# Patient Record
Sex: FEMALE | Race: WHITE | NOT HISPANIC OR LATINO
[De-identification: names, ages, dates, MRNs, and addresses within clinical notes are randomized per-mention and may not be internally consistent; named-entity substitution may affect disease eponyms.]

---

## 2017-07-18 PROBLEM — Z00.00 ENCOUNTER FOR PREVENTIVE HEALTH EXAMINATION: Status: ACTIVE | Noted: 2017-07-18

## 2017-08-01 ENCOUNTER — APPOINTMENT (OUTPATIENT)
Dept: CARDIOLOGY | Facility: CLINIC | Age: 27
End: 2017-08-01

## 2017-08-11 ENCOUNTER — APPOINTMENT (OUTPATIENT)
Dept: CARDIOLOGY | Facility: CLINIC | Age: 27
End: 2017-08-11
Payer: MEDICAID

## 2017-08-11 VITALS
SYSTOLIC BLOOD PRESSURE: 112 MMHG | HEART RATE: 85 BPM | WEIGHT: 100 LBS | DIASTOLIC BLOOD PRESSURE: 78 MMHG | HEIGHT: 63 IN | BODY MASS INDEX: 17.72 KG/M2

## 2017-08-11 DIAGNOSIS — R00.2 PALPITATIONS: ICD-10-CM

## 2017-08-11 DIAGNOSIS — Z82.3 FAMILY HISTORY OF STROKE: ICD-10-CM

## 2017-08-11 DIAGNOSIS — Z82.49 FAMILY HISTORY OF ISCHEMIC HEART DISEASE AND OTHER DISEASES OF THE CIRCULATORY SYSTEM: ICD-10-CM

## 2017-08-11 DIAGNOSIS — D64.9 ANEMIA, UNSPECIFIED: ICD-10-CM

## 2017-08-11 DIAGNOSIS — R06.02 SHORTNESS OF BREATH: ICD-10-CM

## 2017-08-11 DIAGNOSIS — Z87.09 PERSONAL HISTORY OF OTHER DISEASES OF THE RESPIRATORY SYSTEM: ICD-10-CM

## 2017-08-11 PROCEDURE — 99204 OFFICE O/P NEW MOD 45 MIN: CPT

## 2017-08-11 PROCEDURE — 93306 TTE W/DOPPLER COMPLETE: CPT

## 2017-08-11 RX ORDER — MULTIVIT-MIN/IRON/FOLIC ACID/K 18-600-40
500 CAPSULE ORAL DAILY
Refills: 0 | Status: ACTIVE | COMMUNITY

## 2017-08-11 RX ORDER — FERROUS SULFATE 325(65) MG
325 (65 FE) TABLET ORAL DAILY
Refills: 0 | Status: ACTIVE | COMMUNITY

## 2017-10-10 ENCOUNTER — APPOINTMENT (OUTPATIENT)
Dept: CARDIOLOGY | Facility: CLINIC | Age: 27
End: 2017-10-10

## 2019-08-23 ENCOUNTER — RESULT REVIEW (OUTPATIENT)
Age: 29
End: 2019-08-23

## 2020-03-21 ENCOUNTER — EMERGENCY (EMERGENCY)
Facility: HOSPITAL | Age: 30
LOS: 1 days | Discharge: HOME | End: 2020-03-21
Attending: EMERGENCY MEDICINE
Payer: MEDICAID

## 2020-03-21 VITALS
TEMPERATURE: 98 F | SYSTOLIC BLOOD PRESSURE: 117 MMHG | HEART RATE: 95 BPM | WEIGHT: 108.03 LBS | HEIGHT: 63 IN | OXYGEN SATURATION: 100 % | DIASTOLIC BLOOD PRESSURE: 79 MMHG | RESPIRATION RATE: 18 BRPM

## 2020-03-21 VITALS
TEMPERATURE: 99 F | DIASTOLIC BLOOD PRESSURE: 69 MMHG | OXYGEN SATURATION: 99 % | RESPIRATION RATE: 18 BRPM | HEART RATE: 85 BPM | SYSTOLIC BLOOD PRESSURE: 109 MMHG

## 2020-03-21 LAB — RAPID RVP RESULT: SIGNIFICANT CHANGE UP

## 2020-03-21 PROCEDURE — 87633 RESP VIRUS 12-25 TARGETS: CPT

## 2020-03-21 PROCEDURE — 71045 X-RAY EXAM CHEST 1 VIEW: CPT

## 2020-03-21 PROCEDURE — 94640 AIRWAY INHALATION TREATMENT: CPT

## 2020-03-21 PROCEDURE — 99284 EMERGENCY DEPT VISIT MOD MDM: CPT | Mod: 25

## 2020-03-21 PROCEDURE — 87581 M.PNEUMON DNA AMP PROBE: CPT

## 2020-03-21 PROCEDURE — 71045 X-RAY EXAM CHEST 1 VIEW: CPT | Mod: 26

## 2020-03-21 PROCEDURE — 99285 EMERGENCY DEPT VISIT HI MDM: CPT

## 2020-03-21 PROCEDURE — 93010 ELECTROCARDIOGRAM REPORT: CPT

## 2020-03-21 PROCEDURE — U0001: CPT

## 2020-03-21 PROCEDURE — 87798 DETECT AGENT NOS DNA AMP: CPT

## 2020-03-21 PROCEDURE — 87486 CHLMYD PNEUM DNA AMP PROBE: CPT

## 2020-03-21 PROCEDURE — 93005 ELECTROCARDIOGRAM TRACING: CPT

## 2020-03-21 RX ORDER — IPRATROPIUM/ALBUTEROL SULFATE 18-103MCG
3 AEROSOL WITH ADAPTER (GRAM) INHALATION ONCE
Refills: 0 | Status: COMPLETED | OUTPATIENT
Start: 2020-03-21 | End: 2020-03-21

## 2020-03-21 RX ORDER — ACETAMINOPHEN 500 MG
650 TABLET ORAL ONCE
Refills: 0 | Status: COMPLETED | OUTPATIENT
Start: 2020-03-21 | End: 2020-03-21

## 2020-03-21 RX ORDER — AZITHROMYCIN 500 MG/1
500 TABLET, FILM COATED ORAL ONCE
Refills: 0 | Status: COMPLETED | OUTPATIENT
Start: 2020-03-21 | End: 2020-03-21

## 2020-03-21 RX ORDER — AZITHROMYCIN 500 MG/1
1 TABLET, FILM COATED ORAL
Qty: 7 | Refills: 0
Start: 2020-03-21 | End: 2020-03-27

## 2020-03-21 RX ADMIN — Medication 650 MILLIGRAM(S): at 12:14

## 2020-03-21 RX ADMIN — AZITHROMYCIN 500 MILLIGRAM(S): 500 TABLET, FILM COATED ORAL at 09:02

## 2020-03-21 RX ADMIN — Medication 3 MILLILITER(S): at 09:03

## 2020-03-21 RX ADMIN — Medication 50 MILLIGRAM(S): at 09:03

## 2020-03-21 NOTE — ED ADULT NURSE NOTE - OBJECTIVE STATEMENT
patient with mild sob and low grade fever, comes in to be tested for COVID-19 because of  hx of asthma. denies any other recent contact. patient placed on isolation and educated about quarantined.

## 2020-03-21 NOTE — ED ADULT NURSE NOTE - NSIMPLEMENTINTERV_GEN_ALL_ED
Implemented All Universal Safety Interventions:  [place] to call system. Call bell, personal items and telephone within reach. Instruct patient to call for assistance. Room bathroom lighting operational. Non-slip footwear when patient is off stretcher. Physically safe environment: no spills, clutter or unnecessary equipment. Stretcher in lowest position, wheels locked, appropriate side rails in place.

## 2020-03-21 NOTE — ED ADULT TRIAGE NOTE - CHIEF COMPLAINT QUOTE
patient states that she has been sick for 2 weeks with fever SOB and tightness in chest, HX of asthma

## 2020-03-21 NOTE — ED PROVIDER NOTE - PATIENT PORTAL LINK FT
You can access the FollowMyHealth Patient Portal offered by [facility] by registering at the following website: [email or web address]. By joining [employer]’s FollowMyHealth portal, you will also be able to view your health information using other applications (apps) compatible with our system.

## 2020-03-21 NOTE — ED PROVIDER NOTE - OBJECTIVE STATEMENT
The patient is a 30 year old female presents with SOB and cough with history of asthma for 2 weeks  Mild fever  No sick contacts, No travel

## 2020-09-16 ENCOUNTER — RESULT REVIEW (OUTPATIENT)
Age: 30
End: 2020-09-16

## 2021-11-14 ENCOUNTER — EMERGENCY (EMERGENCY)
Facility: HOSPITAL | Age: 31
LOS: 1 days | Discharge: HOME | End: 2021-11-14
Attending: EMERGENCY MEDICINE | Admitting: EMERGENCY MEDICINE
Payer: MEDICAID

## 2021-11-14 VITALS
SYSTOLIC BLOOD PRESSURE: 157 MMHG | HEART RATE: 115 BPM | RESPIRATION RATE: 16 BRPM | DIASTOLIC BLOOD PRESSURE: 90 MMHG | OXYGEN SATURATION: 100 % | TEMPERATURE: 98 F

## 2021-11-14 PROCEDURE — 99285 EMERGENCY DEPT VISIT HI MDM: CPT

## 2021-11-14 NOTE — ED ADULT TRIAGE NOTE - CHIEF COMPLAINT QUOTE
Pt w/ hx of asthma, Beta Thalassemia c/o severe posterior headache radiating anteriorly with associated blurry vision and feeling light headed, nauseous, decreased appetite, and muscle soreness x  4 days.  LMP = 11/9, Pt endorses pain with left lateral rotation and flexion of neck  Pt reports taking Advil 500mg at approximately 1600 today.

## 2021-11-15 ENCOUNTER — EMERGENCY (EMERGENCY)
Facility: HOSPITAL | Age: 31
LOS: 1 days | Discharge: HOME | End: 2021-11-15
Attending: EMERGENCY MEDICINE | Admitting: EMERGENCY MEDICINE
Payer: MEDICAID

## 2021-11-15 VITALS
RESPIRATION RATE: 18 BRPM | SYSTOLIC BLOOD PRESSURE: 105 MMHG | HEART RATE: 82 BPM | OXYGEN SATURATION: 100 % | DIASTOLIC BLOOD PRESSURE: 73 MMHG | TEMPERATURE: 98 F

## 2021-11-15 VITALS
HEART RATE: 100 BPM | DIASTOLIC BLOOD PRESSURE: 86 MMHG | OXYGEN SATURATION: 100 % | TEMPERATURE: 98 F | SYSTOLIC BLOOD PRESSURE: 125 MMHG | RESPIRATION RATE: 16 BRPM

## 2021-11-15 LAB
ALBUMIN SERPL ELPH-MCNC: 4.9 G/DL — SIGNIFICANT CHANGE UP (ref 3.3–5)
ALP SERPL-CCNC: 66 U/L — SIGNIFICANT CHANGE UP (ref 40–120)
ALT FLD-CCNC: 12 U/L — SIGNIFICANT CHANGE UP (ref 4–33)
ANION GAP SERPL CALC-SCNC: 13 MMOL/L — SIGNIFICANT CHANGE UP (ref 7–14)
ANISOCYTOSIS BLD QL: SLIGHT — SIGNIFICANT CHANGE UP
AST SERPL-CCNC: 14 U/L — SIGNIFICANT CHANGE UP (ref 4–32)
B PERT DNA SPEC QL NAA+PROBE: SIGNIFICANT CHANGE UP
B PERT+PARAPERT DNA PNL SPEC NAA+PROBE: SIGNIFICANT CHANGE UP
BASOPHILS # BLD AUTO: 0 K/UL — SIGNIFICANT CHANGE UP (ref 0–0.2)
BASOPHILS NFR BLD AUTO: 0 % — SIGNIFICANT CHANGE UP (ref 0–2)
BILIRUB SERPL-MCNC: 0.4 MG/DL — SIGNIFICANT CHANGE UP (ref 0.2–1.2)
BORDETELLA PARAPERTUSSIS (RAPRVP): SIGNIFICANT CHANGE UP
BUN SERPL-MCNC: 7 MG/DL — SIGNIFICANT CHANGE UP (ref 7–23)
C PNEUM DNA SPEC QL NAA+PROBE: SIGNIFICANT CHANGE UP
CALCIUM SERPL-MCNC: 9.7 MG/DL — SIGNIFICANT CHANGE UP (ref 8.4–10.5)
CHLORIDE SERPL-SCNC: 103 MMOL/L — SIGNIFICANT CHANGE UP (ref 98–107)
CO2 SERPL-SCNC: 22 MMOL/L — SIGNIFICANT CHANGE UP (ref 22–31)
CREAT SERPL-MCNC: 0.49 MG/DL — LOW (ref 0.5–1.3)
EOSINOPHIL # BLD AUTO: 0 K/UL — SIGNIFICANT CHANGE UP (ref 0–0.5)
EOSINOPHIL NFR BLD AUTO: 0 % — SIGNIFICANT CHANGE UP (ref 0–6)
FLUAV SUBTYP SPEC NAA+PROBE: SIGNIFICANT CHANGE UP
FLUBV RNA SPEC QL NAA+PROBE: SIGNIFICANT CHANGE UP
GIANT PLATELETS BLD QL SMEAR: PRESENT — SIGNIFICANT CHANGE UP
GLUCOSE SERPL-MCNC: 85 MG/DL — SIGNIFICANT CHANGE UP (ref 70–99)
HADV DNA SPEC QL NAA+PROBE: SIGNIFICANT CHANGE UP
HCG UR QL: NEGATIVE — SIGNIFICANT CHANGE UP
HCOV 229E RNA SPEC QL NAA+PROBE: SIGNIFICANT CHANGE UP
HCOV HKU1 RNA SPEC QL NAA+PROBE: SIGNIFICANT CHANGE UP
HCOV NL63 RNA SPEC QL NAA+PROBE: SIGNIFICANT CHANGE UP
HCOV OC43 RNA SPEC QL NAA+PROBE: SIGNIFICANT CHANGE UP
HCT VFR BLD CALC: 33 % — LOW (ref 34.5–45)
HGB BLD-MCNC: 10.7 G/DL — LOW (ref 11.5–15.5)
HMPV RNA SPEC QL NAA+PROBE: SIGNIFICANT CHANGE UP
HPIV1 RNA SPEC QL NAA+PROBE: SIGNIFICANT CHANGE UP
HPIV2 RNA SPEC QL NAA+PROBE: SIGNIFICANT CHANGE UP
HPIV3 RNA SPEC QL NAA+PROBE: SIGNIFICANT CHANGE UP
HPIV4 RNA SPEC QL NAA+PROBE: SIGNIFICANT CHANGE UP
IANC: 5.73 K/UL — SIGNIFICANT CHANGE UP (ref 1.5–8.5)
LYMPHOCYTES # BLD AUTO: 1.67 K/UL — SIGNIFICANT CHANGE UP (ref 1–3.3)
LYMPHOCYTES # BLD AUTO: 19.1 % — SIGNIFICANT CHANGE UP (ref 13–44)
M PNEUMO DNA SPEC QL NAA+PROBE: SIGNIFICANT CHANGE UP
MANUAL SMEAR VERIFICATION: SIGNIFICANT CHANGE UP
MCHC RBC-ENTMCNC: 20.9 PG — LOW (ref 27–34)
MCHC RBC-ENTMCNC: 32.4 GM/DL — SIGNIFICANT CHANGE UP (ref 32–36)
MCV RBC AUTO: 64.3 FL — LOW (ref 80–100)
MICROCYTES BLD QL: SIGNIFICANT CHANGE UP
MONOCYTES # BLD AUTO: 0.31 K/UL — SIGNIFICANT CHANGE UP (ref 0–0.9)
MONOCYTES NFR BLD AUTO: 3.5 % — SIGNIFICANT CHANGE UP (ref 2–14)
NEUTROPHILS # BLD AUTO: 6.09 K/UL — SIGNIFICANT CHANGE UP (ref 1.8–7.4)
NEUTROPHILS NFR BLD AUTO: 69.6 % — SIGNIFICANT CHANGE UP (ref 43–77)
OVALOCYTES BLD QL SMEAR: SLIGHT — SIGNIFICANT CHANGE UP
PLAT MORPH BLD: NORMAL — SIGNIFICANT CHANGE UP
PLATELET # BLD AUTO: 297 K/UL — SIGNIFICANT CHANGE UP (ref 150–400)
PLATELET COUNT - ESTIMATE: NORMAL — SIGNIFICANT CHANGE UP
POIKILOCYTOSIS BLD QL AUTO: SIGNIFICANT CHANGE UP
POTASSIUM SERPL-MCNC: 3.7 MMOL/L — SIGNIFICANT CHANGE UP (ref 3.5–5.3)
POTASSIUM SERPL-SCNC: 3.7 MMOL/L — SIGNIFICANT CHANGE UP (ref 3.5–5.3)
PROT SERPL-MCNC: 8 G/DL — SIGNIFICANT CHANGE UP (ref 6–8.3)
RAPID RVP RESULT: SIGNIFICANT CHANGE UP
RBC # BLD: 5.13 M/UL — SIGNIFICANT CHANGE UP (ref 3.8–5.2)
RBC # FLD: 15.5 % — HIGH (ref 10.3–14.5)
RBC BLD AUTO: ABNORMAL
RSV RNA SPEC QL NAA+PROBE: SIGNIFICANT CHANGE UP
RV+EV RNA SPEC QL NAA+PROBE: SIGNIFICANT CHANGE UP
SARS-COV-2 RNA SPEC QL NAA+PROBE: SIGNIFICANT CHANGE UP
SODIUM SERPL-SCNC: 138 MMOL/L — SIGNIFICANT CHANGE UP (ref 135–145)
VARIANT LYMPHS # BLD: 7.8 % — HIGH (ref 0–6)
WBC # BLD: 8.75 K/UL — SIGNIFICANT CHANGE UP (ref 3.8–10.5)
WBC # FLD AUTO: 8.75 K/UL — SIGNIFICANT CHANGE UP (ref 3.8–10.5)

## 2021-11-15 PROCEDURE — 99284 EMERGENCY DEPT VISIT MOD MDM: CPT

## 2021-11-15 PROCEDURE — G1004: CPT

## 2021-11-15 PROCEDURE — 70450 CT HEAD/BRAIN W/O DYE: CPT | Mod: 26,MF

## 2021-11-15 RX ORDER — METOCLOPRAMIDE HCL 10 MG
10 TABLET ORAL ONCE
Refills: 0 | Status: DISCONTINUED | OUTPATIENT
Start: 2021-11-15 | End: 2021-11-15

## 2021-11-15 RX ORDER — ACETAMINOPHEN 500 MG
650 TABLET ORAL ONCE
Refills: 0 | Status: COMPLETED | OUTPATIENT
Start: 2021-11-15 | End: 2021-11-15

## 2021-11-15 RX ORDER — METOCLOPRAMIDE HCL 10 MG
10 TABLET ORAL ONCE
Refills: 0 | Status: COMPLETED | OUTPATIENT
Start: 2021-11-15 | End: 2021-11-15

## 2021-11-15 RX ORDER — METOCLOPRAMIDE HCL 10 MG
5 TABLET ORAL ONCE
Refills: 0 | Status: DISCONTINUED | OUTPATIENT
Start: 2021-11-15 | End: 2021-11-15

## 2021-11-15 RX ORDER — KETOROLAC TROMETHAMINE 30 MG/ML
15 SYRINGE (ML) INJECTION ONCE
Refills: 0 | Status: DISCONTINUED | OUTPATIENT
Start: 2021-11-15 | End: 2021-11-15

## 2021-11-15 RX ORDER — SODIUM CHLORIDE 9 MG/ML
1000 INJECTION INTRAMUSCULAR; INTRAVENOUS; SUBCUTANEOUS ONCE
Refills: 0 | Status: COMPLETED | OUTPATIENT
Start: 2021-11-15 | End: 2021-11-15

## 2021-11-15 RX ORDER — ACETAMINOPHEN 500 MG
975 TABLET ORAL ONCE
Refills: 0 | Status: COMPLETED | OUTPATIENT
Start: 2021-11-15 | End: 2021-11-15

## 2021-11-15 RX ADMIN — SODIUM CHLORIDE 1000 MILLILITER(S): 9 INJECTION INTRAMUSCULAR; INTRAVENOUS; SUBCUTANEOUS at 17:52

## 2021-11-15 RX ADMIN — Medication 15 MILLIGRAM(S): at 17:52

## 2021-11-15 RX ADMIN — Medication 10 MILLIGRAM(S): at 17:53

## 2021-11-15 RX ADMIN — Medication 975 MILLIGRAM(S): at 00:51

## 2021-11-15 RX ADMIN — Medication 10 MILLIGRAM(S): at 00:51

## 2021-11-15 RX ADMIN — Medication 650 MILLIGRAM(S): at 17:52

## 2021-11-15 RX ADMIN — SODIUM CHLORIDE 1000 MILLILITER(S): 9 INJECTION INTRAMUSCULAR; INTRAVENOUS; SUBCUTANEOUS at 00:51

## 2021-11-15 NOTE — ED ADULT TRIAGE NOTE - CHIEF COMPLAINT QUOTE
pt c/o headache and nausea without vomiting. Was seen yesterday for symptoms and advised to return to ED if symptoms persist. No neuro deficits noted.

## 2021-11-15 NOTE — ED PROVIDER NOTE - PATIENT PORTAL LINK FT
You can access the FollowMyHealth Patient Portal offered by [facility] by registering at the following website: [email or web address]. By joining [employer]’s FollowMyHealth portal, you will also be able to view your health information using other applications (apps) compatible with our system.

## 2021-11-15 NOTE — ED ADULT NURSE NOTE - NSIMPLEMENTINTERV_GEN_ALL_ED
Implemented All Universal Safety Interventions:  [place] to call system. Call bell, personal items and telephone within reach. Instruct patient to call for assistance. Room bathroom lighting operational. Non-slip footwear when patient is off stretcher. Physically safe environment: no spills, clutter or unnecessary equipment. Stretcher in lowest position, wheels locked, appropriate side rails in place.

## 2021-11-15 NOTE — ED PROVIDER NOTE - PROGRESS NOTE DETAILS
Patient reported that their headache completely resolved. They report a family emergency that will keep their children at home unattended. Spoke to patient about the risks of discharge at this time including disability or death given her presentation, the lack of CT imaging, and her family history of aneurysms. Patient completely accepted risks and reports that they will follow up with their Neurologist on the 11/30. Patient advised to move follow up sooner. Patient is AxOx4 and of great decision making capacity. Plan to discharge patient. Patient given follow up and return precautions. Patient reported that her headache completely resolved. She reports a family emergency that will keep their children at home unattended. Spoke to patient about the risks of discharge at this time including disability or death given her presentation, the lack of CT imaging, and her family history of aneurysms. Patient completely accepted risks and reports that they will follow up with their Neurologist on the 11/30. Patient advised to move follow up sooner. Patient is AxOx4 and of great decision making capacity. Plan to discharge patient. Instructed that she may return to the ED at any time to continue care. Patient given follow up and return precautions.

## 2021-11-15 NOTE — ED PROVIDER NOTE - NSFOLLOWUPINSTRUCTIONS_ED_ALL_ED_FT
Headache    A headache is pain or discomfort felt around the head or neck area. The specific cause of a headache may not be found as there are many types including tension headaches, migraine headaches, and cluster headaches. Watch your condition for any changes. Things you can do to manage your pain include taking over the counter and prescription medications as instructed by your health care provider, lying down in a dark quiet room, limiting stress, getting regular sleep, and refraining from alcohol and tobacco products.    Follow up as soon as possible with your neurologist. Take tylenol as needed for the headaches.     SEEK IMMEDIATE MEDICAL CARE IF YOU HAVE ANY OF THE FOLLOWING SYMPTOMS: fever, vomiting, stiff neck, loss of vision, problems with speech, muscle weakness, loss of balance, trouble walking, passing out, or confusion.

## 2021-11-15 NOTE — ED PROVIDER NOTE - PHYSICAL EXAMINATION
GEN - NAD; well appearing; A+O x3   HEAD - NC/AT   EYES- PERRL, EOMI  ENT: Airway patent, mmm, Oral cavity and pharynx normal. No inflammation, swelling, exudate, or lesions.    NECK: Neck supple, FROM, no midline spinal ttp, +lymph node to r. posterior cervical region, mobile, minimally tender  PULMONARY - CTA b/l, symmetric breath sounds.   CARDIAC -s1s2, RRR, no M,G,R  ABDOMEN - +BS, ND, NT, soft, no guarding, no rebound, no masses   BACK - no CVA tenderness, Normal  spine   EXTREMITIES - FROM, no edema   SKIN - no rash or bruising   NEUROLOGIC - ao3, cn2-12 intact, 5/5 strength in all extremities, sensation grossly intact, f-n nl, no dysdiadochokinesia, gait steady  PSYCH -nl mood/affect, nl insight.

## 2021-11-15 NOTE — ED PROVIDER NOTE - ATTENDING CONTRIBUTION TO CARE
DR. MANSFIELD, ATTENDING MD-  I performed a face to face bedside interview with the patient regarding history of present illness, review of symptoms and past medical history. I completed an independent physical exam.  I have discussed the patient's plan of care with the PA.   Documentation as above in the note.    32 y/o female with c/o ha x3 days.  States she frequently has ha's however this episode is prolonged in duration vs typical ha's.  She states onset was gradual, feels like a band is squeezing around her head.  No f/c, neck stiffness, vision change, numbness/tingling/weakness to arms/legs, balance issues.  Given change in character from prior ha's will obtain neuroimaging, tx symptomatically.  Obtain ct head give ivf reglan toradol, likely dc with ha center referral sheet.

## 2021-11-15 NOTE — ED PROVIDER NOTE - OBJECTIVE STATEMENT
28 y/o female pmh asthma, anemia c/o headache x 3 day. Pt was seen in the ER x 1 day ago and felt better fater treatment and went home. Pt states that she woke up today with worsening R sided headache, neck pain and R shoulder pain. Pt denies taking any OTC meds. Denies numbness, tingling, weakness, dizziness, photophobia, fever or chills.

## 2021-11-15 NOTE — ED PROVIDER NOTE - NS ED ROS FT
ROS:  GENERAL: No fever, no chills  EYES: no change in vision  HEENT: no trouble swallowing, no trouble speaking  CARDIAC: no chest pain  PULMONARY: no cough, no shortness of breath  GI: no abdominal pain, no nausea, no vomiting, no diarrhea, no constipation  : No dysuria, no frequency, no change in appearance, or odor of urine  SKIN: no rashes  NEURO: + headache, no weakness, no numbness  MSK: +myalgias

## 2021-11-15 NOTE — ED ADULT NURSE NOTE - OBJECTIVE STATEMENT
Patient received in room 7 with the complaints of posterior headache radiating to anterior side with blurry vision, muscle soreness and decreased appetite since 4 days. Patient denies dizziness. As per patient she is taking Advil at home with mild relief. No other complaints. Last time she took Advil was 4 pm .Medications given as ordered. Patient tolerated well. No distress noted. VS Stable. Nursing care continues

## 2021-11-15 NOTE — ED PROVIDER NOTE - OBJECTIVE STATEMENT
31f presents to the ed with headache. Patient states noted small lump to r. side of occiput approx 2 weeks ago. Since then has been having mild headaches, which started on r. side of head, now more global intermittently since then. Few days ago developed generalized muscle aches as well and headache was progressively worsening-reached maximal intensity few d ago and has started to improve but still remains-also notes feels its more difficult to focus even with her glasses. is global at this time, still feels a bump in her r. occiput region. No fevers, chills, cough, neck stiffness, cp, sob, abd pain, vomiting, diarrhea, dysuria, focal weakness, numbness, imbalance, confusion. Patient has no recent sick contacts, and has fhx of brain aneurysm in father. Has copper iud for birth control and is not on any hormones. Takes spironolactone.

## 2021-11-15 NOTE — ED ADULT NURSE NOTE - NS PRO PASSIVE SMOKE EXP
Telephone Encounter by French Cho RN at 08/12/17 08:21 AM     Author:  French Cho RN Service:  (none) Author Type:  Registered Nurse     Filed:  08/12/17 08:21 AM Encounter Date:  8/11/2017 Status:  Signed     :  French Cho RN (Registered Nurse)            Dad notified that letter at  for .[RL1.1M]      Revision History        User Key Date/Time User Provider Type Action    > RL1.1 08/12/17 08:21 AM French Cho, RN Registered Nurse Sign    M - Manual             Unknown

## 2021-11-15 NOTE — ED PROVIDER NOTE - CLINICAL SUMMARY MEDICAL DECISION MAKING FREE TEXT BOX
Patient presents to the ed with a bump in her r. occiput which she noted two weeks ago followed by ha which which was mild and gradually worsened, a/w body aches, now improving but still present and very uncomfortable, no neuro deficits, vss, nontoxic appearing, likely lymph node in region of posterior cervical chain possibly causing patient discomfort in occiput-will check labs, ct/cta head/neck/rvp-possibly viral given accompanied by myalgias and starting to improve with presence of lymph nodes, exam not c/w sah however given fhx-eval for mass/aneurysm,  possibly tension/migraine-and reass. Patient presents to the ed with a bump in her r. occiput which she noted two weeks ago followed by ha which which was mild and gradually worsened, a/w body aches, now improving but still present and very uncomfortable, no neuro deficits, vss, nontoxic appearing, likely lymph node in region of posterior cervical chain possibly causing patient discomfort in occiput-will check labs, ct/cta head/neck/rvp-possibly viral given accompanied by myalgias and starting to improve with presence of lymph nodes, exam not c/w sah however given fhx-eval for mass/aneurysm,  more likely tension/migraine-and reass.

## 2021-11-15 NOTE — ED PROVIDER NOTE - PATIENT PORTAL LINK FT
You can access the FollowMyHealth Patient Portal offered by [facility] by registering at the following website: [email or web address]. By joining [email or web address]’s FollowMyHealth portal, you will also be able to view your health information using other applications (apps) compatible with our system.

## 2021-11-16 PROBLEM — D64.9 ANEMIA, UNSPECIFIED: Chronic | Status: ACTIVE | Noted: 2021-11-15

## 2022-08-07 ENCOUNTER — EMERGENCY (EMERGENCY)
Facility: HOSPITAL | Age: 32
LOS: 1 days | Discharge: HOME | End: 2022-08-07
Attending: EMERGENCY MEDICINE | Admitting: EMERGENCY MEDICINE
Payer: MEDICAID

## 2022-08-07 VITALS
TEMPERATURE: 99 F | WEIGHT: 111.99 LBS | DIASTOLIC BLOOD PRESSURE: 88 MMHG | HEART RATE: 115 BPM | RESPIRATION RATE: 17 BRPM | SYSTOLIC BLOOD PRESSURE: 137 MMHG | HEIGHT: 63 IN | OXYGEN SATURATION: 100 %

## 2022-08-07 PROCEDURE — 99282 EMERGENCY DEPT VISIT SF MDM: CPT

## 2022-08-07 NOTE — ED PROVIDER NOTE - CLINICAL SUMMARY MEDICAL DECISION MAKING FREE TEXT BOX
31 y/o F no pmh pw painless "lump" to neck since 11/2021. Told she needed an US and came to ED hoping she could have it done here. Denies fever, chills, headache, chest pain, shortness of breath, abdominal pain, n/v/d, dysuria, weakness, numbness, tingling, weight loss. Appears very well. No distress.   PMD- Dr. Mendoza  Plan: small 1x1 cm mobile node vs cystic lesion right lateral neck, no fluctuance, no erythema, no other masses. No urgent US nor CT scan warranted. Outpatient follow up 33 y/o F no pmh pw painless "lump" to neck since 11/2021. Told today in UC she needed an US and came to ED hoping she could have it done here. Denies fever, chills, headache, chest pain, shortness of breath, abdominal pain, n/v/d, dysuria, weakness, numbness, tingling, weight loss. lump not changed in size.  PMD- Dr. Mendoza, has ENT she will follow up with   Plan: small 1x1 cm mobile node vs cystic lesion right lateral neck, no fluctuance, no erythema, no other masses. No urgent US nor CT scan warranted. Outpatient follow up  d/w pt need for pmd f/u

## 2022-08-07 NOTE — ED PROVIDER NOTE - ATTENDING APP SHARED VISIT CONTRIBUTION OF CARE
Dr. Roberts: I performed a face to face bedside interview with patient regarding history of present illness, review of symptoms and past medical history. I completed an independent physical exam.  I have discussed patient's plan of care with PA.   I agree with note as stated above, having amended the EMR as needed to reflect my findings.   This includes HISTORY OF PRESENT ILLNESS, HIV, PAST MEDICAL/SURGICAL/FAMILY/SOCIAL HISTORY, ALLERGIES AND HOME MEDICATIONS, REVIEW OF SYSTEMS, PHYSICAL EXAM, and any PROGRESS NOTES during the time I functioned as the attending physician for this patient.    dr roberts: 31 y/o F no pmh pw painless "lump" to neck since 11/2021. Told today in UC she needed an US and came to ED hoping she could have it done here. Denies fever, chills, headache, chest pain, shortness of breath, abdominal pain, n/v/d, dysuria, weakness, numbness, tingling, weight loss. lump not changed in size.  PMD- Dr. Mendoza, has ENT she will follow up with   Plan: small 1x1 cm mobile node vs cystic lesion right lateral neck, no fluctuance, no erythema, no other masses. No urgent US nor CT scan warranted. Outpatient follow up  d/w pt need for pmd f/u

## 2022-08-07 NOTE — ED ADULT NURSE NOTE - OBJECTIVE STATEMENT
Patient came from home with complaint of a bump on her neck. Denies any fever, chills, headache, dizziness, nausea or vomit.

## 2022-08-07 NOTE — ED PROVIDER NOTE - HEME-LYMPH CERVICAL ADENOPATHY
small 1x1 cm mobile node vs cystic lesion right lateral neck, no fluctuance, no erythema, no other masses

## 2022-08-07 NOTE — ED PROVIDER NOTE - PHYSICAL EXAMINATION
Gen:  alert, awake, no acute distress  Head:  atraumatic, normocephalic  HEENT: PERRLA, EOMI, normal nose, normal oropharynx, no tonsillar edema, erythema, or exudate; small 1x1 cm mobile node vs cystic lesion right lateral neck, no fluctuance, no erythema, no other masses;   CV:  rrr, nl S1, S2, no m/r/g  Pulm:  lungs CTA b/l  Abd: s/nt/nd, +BS  MSK:  moving all extremities, no back midline ttp, no stepoffs, no cva TTP  Neuro:  grossly intact, no focal deficits  Skin:  clear, dry, intact  Psych: AOx3, normal affect, no apparent risk to self or others

## 2022-08-07 NOTE — ED ADULT TRIAGE NOTE - CHIEF COMPLAINT QUOTE
c/o lump to the right side of neck since november, went to  today and was told to come to ED for US. Denies any pain, fever ,chills.

## 2022-08-07 NOTE — ED PROVIDER NOTE - NSFOLLOWUPINSTRUCTIONS_ED_ALL_ED_FT
Follow up with your PMD within 1-2 days  Take all of your other medications as previously prescribed.  Worsening, continued or ANY new concerning symptoms return to the Emergency Department.

## 2022-08-07 NOTE — ED PROVIDER NOTE - PATIENT PORTAL LINK FT
You can access the FollowMyHealth Patient Portal offered by [facility] by registering at the following website: [email or web address]. By joining [employer]’s FollowMyHealth portal, you will also be able to view your health information using other applications (apps) compatible with our system.

## 2022-08-07 NOTE — ED PROVIDER NOTE - OBJECTIVE STATEMENT
33 y/o F no pmh pw painless "lump" to neck since 11/2021. Told she needed an US and came to ED hoping she could have it done here. Denies fever, chills, headache, chest pain, shortness of breath, abdominal pain, n/v/d, dysuria, weakness, numbness, tingling, weight loss. Appears very well. 31 y/o F no pmh pw painless "lump" to neck since 11/2021. Told she needed an US and came to ED hoping she could have it done here. Denies fever, chills, headache, chest pain, shortness of breath, abdominal pain, n/v/d, dysuria, weakness, numbness, tingling, weight loss. Appears very well. No distress. 31 y/o F no pmh pw painless "lump" to neck since 11/2021. Told today in UC she needed an US and came to ED hoping she could have it done here. Denies fever, chills, headache, chest pain, shortness of breath, abdominal pain, n/v/d, dysuria, weakness, numbness, tingling, weight loss. lump not changed in size.

## 2022-08-07 NOTE — ED PROVIDER NOTE - NS ED ATTENDING STATEMENT MOD
This was a shared visit with the MITCHELL. I reviewed and verified the documentation and independently performed the documented:

## 2024-02-02 ENCOUNTER — INPATIENT (INPATIENT)
Facility: HOSPITAL | Age: 34
LOS: 2 days | Discharge: HOME | End: 2024-02-05
Attending: GENERAL ACUTE CARE HOSPITAL | Admitting: GENERAL ACUTE CARE HOSPITAL
Payer: MEDICAID

## 2024-02-02 VITALS
HEART RATE: 112 BPM | DIASTOLIC BLOOD PRESSURE: 87 MMHG | SYSTOLIC BLOOD PRESSURE: 125 MMHG | TEMPERATURE: 98 F | RESPIRATION RATE: 19 BRPM | HEIGHT: 63 IN | OXYGEN SATURATION: 100 % | WEIGHT: 115.96 LBS

## 2024-02-02 LAB
ALBUMIN SERPL ELPH-MCNC: 4.1 G/DL — SIGNIFICANT CHANGE UP (ref 3.3–5)
ALP SERPL-CCNC: 71 U/L — SIGNIFICANT CHANGE UP (ref 40–120)
ALT FLD-CCNC: 16 U/L — SIGNIFICANT CHANGE UP (ref 12–78)
ANION GAP SERPL CALC-SCNC: 5 MMOL/L — SIGNIFICANT CHANGE UP (ref 5–17)
APPEARANCE UR: ABNORMAL
APTT BLD: 23.8 SEC — LOW (ref 24.5–35.6)
AST SERPL-CCNC: 11 U/L — LOW (ref 15–37)
BACTERIA # UR AUTO: ABNORMAL /HPF
BASOPHILS # BLD AUTO: 0.02 K/UL — SIGNIFICANT CHANGE UP (ref 0–0.2)
BASOPHILS NFR BLD AUTO: 0.2 % — SIGNIFICANT CHANGE UP (ref 0–2)
BILIRUB SERPL-MCNC: 0.8 MG/DL — SIGNIFICANT CHANGE UP (ref 0.2–1.2)
BILIRUB UR-MCNC: NEGATIVE — SIGNIFICANT CHANGE UP
BLD GP AB SCN SERPL QL: SIGNIFICANT CHANGE UP
BUN SERPL-MCNC: 11 MG/DL — SIGNIFICANT CHANGE UP (ref 7–23)
CALCIUM SERPL-MCNC: 9.5 MG/DL — SIGNIFICANT CHANGE UP (ref 8.5–10.1)
CHLORIDE SERPL-SCNC: 108 MMOL/L — SIGNIFICANT CHANGE UP (ref 96–108)
CO2 SERPL-SCNC: 29 MMOL/L — SIGNIFICANT CHANGE UP (ref 22–31)
COLOR SPEC: YELLOW — SIGNIFICANT CHANGE UP
CREAT SERPL-MCNC: 0.54 MG/DL — SIGNIFICANT CHANGE UP (ref 0.5–1.3)
DIFF PNL FLD: ABNORMAL
EGFR: 124 ML/MIN/1.73M2 — SIGNIFICANT CHANGE UP
ELLIPTOCYTES BLD QL SMEAR: SLIGHT — SIGNIFICANT CHANGE UP
EOSINOPHIL # BLD AUTO: 0.02 K/UL — SIGNIFICANT CHANGE UP (ref 0–0.5)
EOSINOPHIL NFR BLD AUTO: 0.2 % — SIGNIFICANT CHANGE UP (ref 0–6)
EPI CELLS # UR: PRESENT
GLUCOSE SERPL-MCNC: 118 MG/DL — HIGH (ref 70–99)
GLUCOSE UR QL: NEGATIVE MG/DL — SIGNIFICANT CHANGE UP
HCG SERPL-ACNC: <1 MIU/ML — SIGNIFICANT CHANGE UP
HCT VFR BLD CALC: 32.8 % — LOW (ref 34.5–45)
HGB BLD-MCNC: 10.5 G/DL — LOW (ref 11.5–15.5)
HYPOCHROMIA BLD QL: SIGNIFICANT CHANGE UP
IMM GRANULOCYTES NFR BLD AUTO: 0.2 % — SIGNIFICANT CHANGE UP (ref 0–0.9)
INR BLD: 1.03 RATIO — SIGNIFICANT CHANGE UP (ref 0.85–1.18)
KETONES UR-MCNC: 40 MG/DL
LACTATE SERPL-SCNC: 1.6 MMOL/L — SIGNIFICANT CHANGE UP (ref 0.7–2)
LEUKOCYTE ESTERASE UR-ACNC: ABNORMAL
LIDOCAIN IGE QN: 19 U/L — SIGNIFICANT CHANGE UP (ref 13–75)
LYMPHOCYTES # BLD AUTO: 1.01 K/UL — SIGNIFICANT CHANGE UP (ref 1–3.3)
LYMPHOCYTES # BLD AUTO: 10.9 % — LOW (ref 13–44)
MANUAL SMEAR VERIFICATION: SIGNIFICANT CHANGE UP
MCHC RBC-ENTMCNC: 20.5 PG — LOW (ref 27–34)
MCHC RBC-ENTMCNC: 32 G/DL — SIGNIFICANT CHANGE UP (ref 32–36)
MCV RBC AUTO: 64.2 FL — LOW (ref 80–100)
MICROCYTES BLD QL: SLIGHT — SIGNIFICANT CHANGE UP
MONOCYTES # BLD AUTO: 0.39 K/UL — SIGNIFICANT CHANGE UP (ref 0–0.9)
MONOCYTES NFR BLD AUTO: 4.2 % — SIGNIFICANT CHANGE UP (ref 2–14)
NEUTROPHILS # BLD AUTO: 7.81 K/UL — HIGH (ref 1.8–7.4)
NEUTROPHILS NFR BLD AUTO: 84.3 % — HIGH (ref 43–77)
NITRITE UR-MCNC: NEGATIVE — SIGNIFICANT CHANGE UP
NRBC # BLD: 0 /100 WBCS — SIGNIFICANT CHANGE UP (ref 0–0)
PH UR: 6.5 — SIGNIFICANT CHANGE UP (ref 5–8)
PLAT MORPH BLD: NORMAL — SIGNIFICANT CHANGE UP
PLATELET # BLD AUTO: 311 K/UL — SIGNIFICANT CHANGE UP (ref 150–400)
POTASSIUM SERPL-MCNC: 3.7 MMOL/L — SIGNIFICANT CHANGE UP (ref 3.5–5.3)
POTASSIUM SERPL-SCNC: 3.7 MMOL/L — SIGNIFICANT CHANGE UP (ref 3.5–5.3)
PROT SERPL-MCNC: 7.8 GM/DL — SIGNIFICANT CHANGE UP (ref 6–8.3)
PROT UR-MCNC: NEGATIVE MG/DL — SIGNIFICANT CHANGE UP
PROTHROM AB SERPL-ACNC: 12.2 SEC — SIGNIFICANT CHANGE UP (ref 9.5–13)
RBC # BLD: 5.11 M/UL — SIGNIFICANT CHANGE UP (ref 3.8–5.2)
RBC # FLD: 15.8 % — HIGH (ref 10.3–14.5)
RBC BLD AUTO: ABNORMAL
RBC CASTS # UR COMP ASSIST: 45 /HPF — HIGH (ref 0–4)
SODIUM SERPL-SCNC: 142 MMOL/L — SIGNIFICANT CHANGE UP (ref 135–145)
SP GR SPEC: >1.03 — HIGH (ref 1–1.03)
UROBILINOGEN FLD QL: 1 MG/DL — SIGNIFICANT CHANGE UP (ref 0.2–1)
WBC # BLD: 9.27 K/UL — SIGNIFICANT CHANGE UP (ref 3.8–10.5)
WBC # FLD AUTO: 9.27 K/UL — SIGNIFICANT CHANGE UP (ref 3.8–10.5)
WBC UR QL: 10 /HPF — HIGH (ref 0–5)

## 2024-02-02 PROCEDURE — 99222 1ST HOSP IP/OBS MODERATE 55: CPT

## 2024-02-02 PROCEDURE — 76830 TRANSVAGINAL US NON-OB: CPT | Mod: 26

## 2024-02-02 PROCEDURE — 99285 EMERGENCY DEPT VISIT HI MDM: CPT

## 2024-02-02 PROCEDURE — 74177 CT ABD & PELVIS W/CONTRAST: CPT | Mod: 26,MA

## 2024-02-02 RX ORDER — MORPHINE SULFATE 50 MG/1
2 CAPSULE, EXTENDED RELEASE ORAL EVERY 4 HOURS
Refills: 0 | Status: DISCONTINUED | OUTPATIENT
Start: 2024-02-02 | End: 2024-02-04

## 2024-02-02 RX ORDER — CEFTRIAXONE 500 MG/1
1000 INJECTION, POWDER, FOR SOLUTION INTRAMUSCULAR; INTRAVENOUS EVERY 24 HOURS
Refills: 0 | Status: DISCONTINUED | OUTPATIENT
Start: 2024-02-03 | End: 2024-02-04

## 2024-02-02 RX ORDER — ONDANSETRON 8 MG/1
4 TABLET, FILM COATED ORAL ONCE
Refills: 0 | Status: COMPLETED | OUTPATIENT
Start: 2024-02-02 | End: 2024-02-02

## 2024-02-02 RX ORDER — ENOXAPARIN SODIUM 100 MG/ML
30 INJECTION SUBCUTANEOUS EVERY 24 HOURS
Refills: 0 | Status: DISCONTINUED | OUTPATIENT
Start: 2024-02-02 | End: 2024-02-02

## 2024-02-02 RX ORDER — TAMSULOSIN HYDROCHLORIDE 0.4 MG/1
0.4 CAPSULE ORAL AT BEDTIME
Refills: 0 | Status: DISCONTINUED | OUTPATIENT
Start: 2024-02-02 | End: 2024-02-03

## 2024-02-02 RX ORDER — CHLORHEXIDINE GLUCONATE 213 G/1000ML
1 SOLUTION TOPICAL
Refills: 0 | Status: DISCONTINUED | OUTPATIENT
Start: 2024-02-02 | End: 2024-02-04

## 2024-02-02 RX ORDER — FERROUS SULFATE 325(65) MG
325 TABLET ORAL DAILY
Refills: 0 | Status: DISCONTINUED | OUTPATIENT
Start: 2024-02-02 | End: 2024-02-04

## 2024-02-02 RX ORDER — ACETAMINOPHEN 500 MG
1000 TABLET ORAL ONCE
Refills: 0 | Status: COMPLETED | OUTPATIENT
Start: 2024-02-02 | End: 2024-02-02

## 2024-02-02 RX ORDER — KETOROLAC TROMETHAMINE 30 MG/ML
15 SYRINGE (ML) INJECTION ONCE
Refills: 0 | Status: DISCONTINUED | OUTPATIENT
Start: 2024-02-02 | End: 2024-02-02

## 2024-02-02 RX ORDER — SODIUM CHLORIDE 9 MG/ML
1000 INJECTION, SOLUTION INTRAVENOUS
Refills: 0 | Status: DISCONTINUED | OUTPATIENT
Start: 2024-02-02 | End: 2024-02-04

## 2024-02-02 RX ORDER — INFLUENZA VIRUS VACCINE 15; 15; 15; 15 UG/.5ML; UG/.5ML; UG/.5ML; UG/.5ML
0.5 SUSPENSION INTRAMUSCULAR ONCE
Refills: 0 | Status: DISCONTINUED | OUTPATIENT
Start: 2024-02-02 | End: 2024-02-05

## 2024-02-02 RX ORDER — MORPHINE SULFATE 50 MG/1
4 CAPSULE, EXTENDED RELEASE ORAL ONCE
Refills: 0 | Status: DISCONTINUED | OUTPATIENT
Start: 2024-02-02 | End: 2024-02-02

## 2024-02-02 RX ORDER — ENOXAPARIN SODIUM 100 MG/ML
40 INJECTION SUBCUTANEOUS EVERY 24 HOURS
Refills: 0 | Status: DISCONTINUED | OUTPATIENT
Start: 2024-02-02 | End: 2024-02-04

## 2024-02-02 RX ORDER — CEFTRIAXONE 500 MG/1
1000 INJECTION, POWDER, FOR SOLUTION INTRAMUSCULAR; INTRAVENOUS ONCE
Refills: 0 | Status: COMPLETED | OUTPATIENT
Start: 2024-02-02 | End: 2024-02-02

## 2024-02-02 RX ORDER — SODIUM CHLORIDE 9 MG/ML
1000 INJECTION INTRAMUSCULAR; INTRAVENOUS; SUBCUTANEOUS ONCE
Refills: 0 | Status: COMPLETED | OUTPATIENT
Start: 2024-02-02 | End: 2024-02-02

## 2024-02-02 RX ADMIN — SODIUM CHLORIDE 1000 MILLILITER(S): 9 INJECTION INTRAMUSCULAR; INTRAVENOUS; SUBCUTANEOUS at 14:27

## 2024-02-02 RX ADMIN — MORPHINE SULFATE 4 MILLIGRAM(S): 50 CAPSULE, EXTENDED RELEASE ORAL at 18:26

## 2024-02-02 RX ADMIN — SODIUM CHLORIDE 150 MILLILITER(S): 9 INJECTION, SOLUTION INTRAVENOUS at 23:00

## 2024-02-02 RX ADMIN — ONDANSETRON 4 MILLIGRAM(S): 8 TABLET, FILM COATED ORAL at 13:11

## 2024-02-02 RX ADMIN — SODIUM CHLORIDE 150 MILLILITER(S): 9 INJECTION, SOLUTION INTRAVENOUS at 20:28

## 2024-02-02 RX ADMIN — Medication 15 MILLIGRAM(S): at 18:13

## 2024-02-02 RX ADMIN — Medication 400 MILLIGRAM(S): at 18:27

## 2024-02-02 RX ADMIN — TAMSULOSIN HYDROCHLORIDE 0.4 MILLIGRAM(S): 0.4 CAPSULE ORAL at 23:00

## 2024-02-02 RX ADMIN — MORPHINE SULFATE 4 MILLIGRAM(S): 50 CAPSULE, EXTENDED RELEASE ORAL at 15:11

## 2024-02-02 RX ADMIN — SODIUM CHLORIDE 1000 MILLILITER(S): 9 INJECTION INTRAMUSCULAR; INTRAVENOUS; SUBCUTANEOUS at 13:11

## 2024-02-02 RX ADMIN — Medication 15 MILLIGRAM(S): at 17:35

## 2024-02-02 RX ADMIN — MORPHINE SULFATE 2 MILLIGRAM(S): 50 CAPSULE, EXTENDED RELEASE ORAL at 20:28

## 2024-02-02 RX ADMIN — Medication 15 MILLIGRAM(S): at 15:11

## 2024-02-02 RX ADMIN — ONDANSETRON 4 MILLIGRAM(S): 8 TABLET, FILM COATED ORAL at 17:34

## 2024-02-02 RX ADMIN — SODIUM CHLORIDE 1000 MILLILITER(S): 9 INJECTION INTRAMUSCULAR; INTRAVENOUS; SUBCUTANEOUS at 15:11

## 2024-02-02 RX ADMIN — CEFTRIAXONE 100 MILLIGRAM(S): 500 INJECTION, POWDER, FOR SOLUTION INTRAMUSCULAR; INTRAVENOUS at 18:33

## 2024-02-02 RX ADMIN — MORPHINE SULFATE 4 MILLIGRAM(S): 50 CAPSULE, EXTENDED RELEASE ORAL at 13:11

## 2024-02-02 RX ADMIN — Medication 15 MILLIGRAM(S): at 14:26

## 2024-02-02 NOTE — H&P ADULT - NSHPLABSRESULTS_GEN_ALL_CORE
(02-02 @ 13:15)                      10.5  9.27 )-----------( 311                 32.8    Neutrophils = 7.81 (84.3%)  Lymphocytes = 1.01 (10.9%)  Eosinophils = 0.02 (0.2%)  Basophils = 0.02 (0.2%)  Monocytes = 0.39 (4.2%)  Bands = --%    02-02    142  |  108  |  11  ----------------------------<  118<H>  3.7   |  29  |  0.54    Ca    9.5      02 Feb 2024 13:15    TPro  7.8  /  Alb  4.1  /  TBili  0.8  /  DBili  x   /  AST  11<L>  /  ALT  16  /  AlkPhos  71  02-02    ( 02 Feb 2024 13:15 )   PT: 12.2 sec;   INR: 1.03 ratio;       PTT:23.8 sec      RVP:          Tox:         Urinalysis Basic - ( 02 Feb 2024 17:30 )    Color: Yellow / Appearance: Cloudy / SG: >1.030 / pH: x  Gluc: x / Ketone: 40 mg/dL  / Bili: Negative / Urobili: 1.0 mg/dL   Blood: x / Protein: Negative mg/dL / Nitrite: Negative   Leuk Esterase: Moderate / RBC: 45 /HPF / WBC 10 /HPF   Sq Epi: x / Non Sq Epi: x / Bacteria: Moderate /HPF

## 2024-02-02 NOTE — H&P ADULT - NSHPADDITIONALINFOADULT_GEN_ALL_CORE
Attg.    Pt has failed MET and is in agony  cx still pending and though AVSS, OR today for stone extraction and ureteral stent placement, rt.    She understands and consented.  On Rocephin

## 2024-02-02 NOTE — ED PROVIDER NOTE - ATTENDING APP SHARED VISIT CONTRIBUTION OF CARE
I have seen the patient with the MITCHELL and agree with above examination and assessment and plan with the following addendum:        Focused PE:   General: NAD, alert and oriented  Head: Normocephalic, atraumatic  Eyes: PERRLA, EOMI  Cardiac: RRR, no murmurs, rubs or gallops  Resp: CTA, no wheezes, rales or ronchi  GI: Nondistended, nontender, no rebound or guarding  MSK: R. CVA tenderness  Neuro: Alert and oriented, no focal deficits.   Ext: Non edematous, nontender.     Ddx: Kidney stone/ ro pyelo, infected stone  Plan: Cbc, cmp, ua, ucx, CT, pain meds, reassess

## 2024-02-02 NOTE — ED ADULT NURSE NOTE - OBJECTIVE STATEMENT
patient A+ox4 came in complaining of abdominal pain. Patient states it started 2-3 hours ago patient A+ox4 came in complaining of abdominal pain. Patient states it started 2-3 hours ago R flank pain 10/10 radiating to epigastric region and lower back. Upon assessment, patient had tender RUQ, RLQ, guarding, crying. patient denies fevers, but reports chills and body aches and the urge of having to urinate. Bladder scan was done, 30mls. Patient also complains of nausea and vomiting, and white vaginal discharge prior to start period on 1/27.

## 2024-02-02 NOTE — PATIENT PROFILE ADULT - FALL HARM RISK - CONCLUSION
Consults signed by  at 08/06/18 02:42 PM      Author:  Provider, Outside Service:  (none) Author Type:  Physician     Filed:  08/06/18 02:42 PM Encounter Date:  8/4/2018 Status:  Addendum     :  Transcript, Incoming (Resource)               History of Present Illness:  Date and Time Patient Seen: 04-Aug-2018 12:00  Chief Complaint or Purpose for Consult: Acute respiratory failure  History Obtained From: patient  HPI: This is an 80 year old man with a PMH as noted below who presented to [facility] on 8/4 complaining of difficulty breathing for 3 days prior to  presentation. The patient has been under additional emotional stress the day  before presentation; he buried his close friend. He has been using albuterol at  least 8 times a day and has had increasing home oxygen requirements. Upon  arrival to the ER, the patient was started on systemic steroids and given  continuous nebs. BiPAP was discussed but not used. He was admitted to [place] [facility] for  further management as his symptoms improved. Upon evaluation in the morning by  the [facility] rounder, the patient was restless, air hungry, in severe respiratory  distress. He was placed on BiPAP and transferred to the ICU for further  management. The patient is currently anxious, restless, air hungry, on BiPAP.  Therefore, all history was obtained from family, via review of the EMR. I was  unable to obtain a ROS.        Past Medical and Past Surgical:  COPD, CAD, HTN, HLD, Neurogenic Bladder, Ascending Aortic Aneurysm: Status:  Active  Tonsillectomy: Status: Active     Family History:  Negative for lung disease: Onset Date: 06-Feb-2018, Status: Active     Social History:  Former heavy smoker, Social EtOH: Status: Active        Hospital Medications:  Order NameDate  Acetaminophen 325mg Tab04-Aug-2018  Ondansetron  Injection 4MG04-Aug-2018  Amlodipine TAB 5MG [Norvasc]04-Aug-2018  Aspirin Tab 325MG04-Aug-2018  Carvedilol Tab  6.25MG04-Aug-2018  Heparin (Porcine)  5,000UNIT SYRG04-Aug-2018  Hydrochlorothiazide Cap 25MG04-Aug-2018  Irbesartan Tab 150MG [Avapro]04-Aug-2018  NONFORM 1-PT OWN MED04-Aug-2018  Lorazepam Injection 2MG/ML04-Aug-2018  Dexmedetomidine Drip04-Aug-2018  methylprednisoLONE SOD SUCC Inj  125mg04-Aug-2018  Lactated Ringers 1000ml04-Aug-2018  Levofloxacin IVPB 500MG04-Aug-2018  Pravastatin Tab 20mg [Pravachol]04-Aug-2018        Vitals (ABP, Oxygen, Hemodynamics, RASS CAM SAT):  1. Vital Signs Flowsheet:  Date/[id number] 17:0004-Aug-18 18:4504-Aug-18 19:00  Oxygen Device/RateHHBNC 40 lpm; FI02; 30%  SPO2 %100  Blanton Agitation and Sedation Scale(0) Alert and calm        Intake and Output:  2. I&O Flowsheet:  Date/TimeLength of Stay TotalsGrand Totals  04-Aug-18 23:00  DailyIntake: 1124.2  Output: 1225     Net: -100.8Intake: 1124.2  Output: 1225     Net: -100.8  24 Hr.: -100.8        Physical Exam:  General: Restless, anxious man in moderate to severe respiratory distress  despite BiPAP  Skin: normal color, warm and dry  Eyes: PERRLA/EOMI, conjunctiva clear  ENMT: BiPAP mask in place  Head and Neck: Neck supple, trachea midline  Respiratory and Thorax: Severely decreased air exchange. No wheezing heard as  air exchange is so poor.  Cardiovascular: RRR  Extremities: No lower extremity edema.  Symmetric pulses.  Gastrointestinal: Soft, non tender, non distended. +bowel sounds  Genitourinary: Wilkins in place  Musculoskeletal: normal strength and range of motion  Neurologic: alert and oriented x3; nonfocal exam with symmetric strength  Psychiatric: Anxious, air hungry  Lymphatic: No palpable cervical lymphadenopathy.        Lab Results:  Arterial Blood Gas:    04-Aug-18 09:47, Arterial Blood Gas  PCO2:   48.6  Total Hemoglobin: 14.1  Oxyhemoglobin: 98.4  Carboxyhemoglobin: 0.9  Methemoglobin: 0.8  General Chemistry:    04-Aug-18 02:01, Comprehensive Metabolic  Glucose:   129  BUN: 19  Creatinine: 0.72  NA:   135  Potassium Level: 3.6  CL:   97  CO2: 28  Anion  Fall Risk Gap: 10  Calcium: 9.2  Total Protein: 7.5  Albumin Serum: 4.4  AST (SGOT): 24  ALT (SGPT): 30    04-Aug-18 16:25, CK  CK: 133    04-Aug-18 16:25, Troponin I  Troponin I: <0.012  General Hematology:    04-Aug-18 02:01, Complete Blood Count  WBC:   16.0  RBC:   4.59  HGB: 14.4  HCT:   41.8  MCV: 91.1  MCH: 31.4  MCHC: 34.5  Platelet Level: 213     Assessment and Plan:  Assessment/Plan 1: Assessment:     -- Acute on chronic respiratory failure  -- COPD exacerbation  -- Negative procalcitonin (<0.25)  -- Stress hyperglycemia  -- Stress leukocytosis  -  Assessment/Plan 2: Plan:     -- Will continue systemic steroids, duoneb Q6, albuterol nebs Q6 with  additional albuterol as needed to treat severe COPD exacerbation. BiPAP as  needed with breaks as tolerated. NPO while on NPPV. Will use precedex gtt to  treat anxiety/air hunger while the patient is on BiPAP. PCT is negative;  recommend d/c antimicrobial therapy.  -- Plan to actively monitor blood glucose levels and treat with insulin if  necessary to keep levels below 180 mg/dL.  -- Leukocytosis is likely reactive. Continue to monitor WBC count with ongoing  treatment.  -- Heparin SQ for DVT prophylaxis. No absolute indication for GI prophylaxis.     Code status discussed with the patient's son. Dr. Blevins discussed code status  with the patient when he was less anxious; the patient is DNR/DNI. The order  was placed in the EMR by Dr. Blevins.     Case discussed with Dr. Felicity Thompson.     The patient has his home albuterol inhaler at the bedside, which he insists on  using at his own discretion. He was strongly counseled against doing so but is  not amenable.     I spent 60 minutes critical care time, exclusive of any procedures performed.  -     Electronic Signatures:  KT BENAVIDES)  (Signed 05-Aug-18 15:55)  Authored: History of Present Illness, History, Hospital Medications, Vitals,  Intake and Output, Physical Examination, Results, Assessment and Plan         Last Updated: 05-Aug-18 15:55 by KT BENAVIDES)  [IT1.1M]    Revision History        User Key Date/Time User Provider Type Action    > [N/A] 08/06/18 02:42 PM Transcript, Incoming Resource Addend     [N/A] 08/06/18 02:42 PM Transcript, Incoming Resource Addend     IT1.1 08/06/18 02:42 PM Transcript, Incoming Resource Sign    M - Manual

## 2024-02-02 NOTE — H&P ADULT - ASSESSMENT
MERA LIU is a 34y Female with a past medical history as described above who presented for evaluation of R flank pain. Found to have UVJ stone.     # UVJ stone  - Continue pain control, morphine 2mg IV q4h PRN, adjust as necessary, if BP too soft can do Toradol   - Ceftriaxone 1g IV daily   - Urology recs appreciated  - Strain urine for stone     # Microcytic anemia   - Iron studies in the AM  - Possible menstrual losses   - Start oral iron     DVT ppx: Lovenox 40 mg SubQ daily   GI ppx: Pepcid 20mg daily   Diet: Regular   Activity: IAT   Dispo: From home   Code: FULL  MERA LIU is a 34y Female with a past medical history as described above who presented for evaluation of R flank pain. Found to have UVJ stone.     # UVJ stone  - Continue pain control, morphine 2mg IV q4h PRN, adjust as necessary, if BP too soft can do Toradol   - Ceftriaxone 1g IV daily   - Flomax 0.4mg daily   - Urology recs appreciated  - Strain urine for stone     # Microcytic anemia   - Iron studies in the AM  - Possible menstrual losses   - Start oral iron     DVT ppx: Lovenox 40 mg SubQ daily   GI ppx: Pepcid 20mg daily   Diet: Regular   Activity: IAT   Dispo: From home   Code: FULL  MERA LIU is a 34y Female with a past medical history as described above who presented for evaluation of R flank pain. Found to have UVJ stone.     # UVJ stone  - Continue pain control, morphine 2mg IV q4h PRN, adjust as necessary, if BP too soft can do Toradol   - Ceftriaxone 1g IV daily   - Flomax 0.4mg daily   - Urology recs appreciated  - Strain urine for stone     # UTI  - UA positive for WBCs  - Ceftriaxone 1g daily   - Follow-up cultures     # Microcytic anemia   - Iron studies in the AM  - Possible menstrual losses   - Start oral iron     DVT ppx: Lovenox 40 mg SubQ daily   GI ppx: Pepcid 20mg daily   Diet: Regular   Activity: IAT   Dispo: From home   Code: FULL

## 2024-02-02 NOTE — ED PROVIDER NOTE - CLINICAL SUMMARY MEDICAL DECISION MAKING FREE TEXT BOX
34-year-old female with no significant past medical history no surgical history presents emergency room for right-sided lower abdominal and side/back pain that began 3 hours ago.  +nausea without vomiting, diarrhea.  Reports painful urination. Denies any recent abnormal vaginal discharge, pelvic pain.  Denies chance of pregnancy.  Denies history of kidney stones.  Denies any heavy lifting, trauma or injury. Vital signs stable, appears very uncomfortable, bladder scan 30, pain worse with movement, no RLQ tenderness, R CVA tenderness, less likely paraspinal tenderness. No pelvis exam performed due to pain and urgent US for r/o ovarian torsion, differential diagnosis also includes renal stone vs MSK pain/spasm, UTI vs pyelo, low suspicion for pregnancy or ectopic or appendicitis. Will get labs, meds, IVF, US and CT, dispo pending results.

## 2024-02-02 NOTE — ED PROVIDER NOTE - PROGRESS NOTE DETAILS
JAH Rodriguez: Spoke with urology PA, patient has 3 mm stone with moderate hydroureteronephrosis and infected urine with leuk esterase and 10 white blood cells, no white count or fever, patient still having significant amount of pain and nausea, unable to move.  Pending evaluation from urology with plan for admission for pain control at minimum. JAH Rodriguez: will admit to medicine with surgery/urology following, case discussed with surg PA/Dr Mckay, Dr Yost hospitalist aware

## 2024-02-02 NOTE — CONSULT NOTE ADULT - SUBJECTIVE AND OBJECTIVE BOX
Patient is a 34y old  Female who presents with a chief complaint of     HPI: 33 yo F no pmhx presented to er with right flank pain since 10 am. intermittent. severe, radiates to RLQ. +nausea, no vomiting. endorses chills but denies fever, dysuria, hematuria, increased urinary frequency. no hx of renal stones. lmp 1/18/24, finished 1/31/24      PAST MEDICAL & SURGICAL HISTORY:  Anemia      No pertinent past medical history          Review of Systems:  Negative except  as above in HPI    MEDICATIONS  (STANDING):  lactated ringers. 1000 milliLiter(s) (150 mL/Hr) IV Continuous <Continuous>    MEDICATIONS  (PRN):      Allergies  No Known Allergies      SOCIAL HISTORY: nonsmoker, no etoh use                Vital Signs Last 24 Hrs  T(C): 36.8 (02 Feb 2024 15:22), Max: 36.8 (02 Feb 2024 15:22)  T(F): 98.2 (02 Feb 2024 15:22), Max: 98.2 (02 Feb 2024 15:22)  HR: 87 (02 Feb 2024 15:22) (87 - 112)  BP: 106/70 (02 Feb 2024 15:22) (106/70 - 125/87)  BP(mean): --  RR: 18 (02 Feb 2024 15:22) (18 - 19)  SpO2: 100% (02 Feb 2024 15:22) (100% - 100%)    Parameters below as of 02 Feb 2024 15:22  Patient On (Oxygen Delivery Method): room air        Physical Exam:  General:  Appears stated age, uncomfortable 2/2 to pain.   Eyes: EOMI, conjunctiva clear  HENT:  WNL, no JVD  Chest: no respiratory distress, no accessory muscle use   Abdomen:  soft, nondistended, mild rlq tenderness, no rebound or guarding   : no suprapubic tenderness or distension, neg cvat bilateral   Neuro/Psych:  Alert, oriented to time, place and person     LABS:                        10.5   9.27  )-----------( 311      ( 02 Feb 2024 13:15 )             32.8     02-02    142  |  108  |  11  ----------------------------<  118<H>  3.7   |  29  |  0.54    Ca    9.5      02 Feb 2024 13:15    TPro  7.8  /  Alb  4.1  /  TBili  0.8  /  DBili  x   /  AST  11<L>  /  ALT  16  /  AlkPhos  71  02-02    PT/INR - ( 02 Feb 2024 13:15 )   PT: 12.2 sec;   INR: 1.03 ratio         PTT - ( 02 Feb 2024 13:15 )  PTT:23.8 sec  Urinalysis Basic - ( 02 Feb 2024 17:30 )    Color: Yellow / Appearance: Cloudy / SG: >1.030 / pH: x  Gluc: x / Ketone: 40 mg/dL  / Bili: Negative / Urobili: 1.0 mg/dL   Blood: x / Protein: Negative mg/dL / Nitrite: Negative   Leuk Esterase: Moderate / RBC: 45 /HPF / WBC 10 /HPF   Sq Epi: x / Non Sq Epi: x / Bacteria: Moderate /HPF          RADIOLOGY & ADDITIONAL STUDIES:  < from: CT Abdomen and Pelvis w/ IV Cont (02.02.24 @ 16:59) >  MPRESSION:  Delayed right nephrogram with moderate right hydroureteronephrosis   secondary to a 0.3 cm calculus at the right ureterovesicular junction.   Asymmetric right perinephric fluid. Additional nonobstructing right renal   calculi, 0.2-0.3 cm.    Indeterminate right hepatic lobe hypoattenuating foci,1.2 cm and 1.1 cm.   Recommend further evaluation with MRI abdomen with IV contrast.        --- End of Report ---    < end of copied text >      A/P: 33 yo F no pmhx presented to er with right flank pain since 10 am. found to have 3 mm calculi in R UVJ with moderate hydro and perinephric fluid with intractable pain  -admit to medicine  -c/w rocephin, Follow up Ucx  -IVFH, strain urine. If pt's BP can tolerate start flomax  -analgesics PRN  -discussed with urologist, Dr Mckay

## 2024-02-02 NOTE — CONSULT NOTE ADULT - NS ATTEND AMEND GEN_ALL_CORE FT
Pt w severe discomfort that was better controlled when examined on 2/3/24 when I met her.  Had just eaten and was tolerating oral fluid intake.  IVF's running  Discussed MET and straining urine.  Though labs normal, dirty urine  On rocephin: f/u cx    If still w flank pain and/or persistent n/v, will stent tomorrow w likely URS for stone extraction.    pt understood.

## 2024-02-02 NOTE — PATIENT PROFILE ADULT - FALL HARM RISK - RISK INTERVENTIONS

## 2024-02-02 NOTE — ED ADULT NURSE NOTE - NS PRO PASSIVE SMOKE EXP
3y F presents after hitting her face on the handle of a shopping cart. patient sustained a laceration to the bridge of her nose. no loc or vomiting. no injury to any other part of the face. called Dr. Brown en-route to ED.
No

## 2024-02-02 NOTE — PHARMACOTHERAPY INTERVENTION NOTE - COMMENTS
Spoke to PA regarding rate 150ml/hr LR. Patient weight is 52kg. Suggested 100ml/hr. PA wants to continue order as is.

## 2024-02-02 NOTE — ED ADULT TRIAGE NOTE - CHIEF COMPLAINT QUOTE
Patient c/o right constant sharp right flank pain starting this morning. Patient states decreased urine output. LMP 1/27/24  hx anemia

## 2024-02-02 NOTE — H&P ADULT - HISTORY OF PRESENT ILLNESS
Patient is a 34F with no significant past medical history who presented for evaluation of flank pain. Patient reports that this morning around 10AM she suddenly felt right-sided, sharp, pain. Was in her usual state of health before. Endorses chills for the last 2 days, but no other symptoms. Denies previous episodes.   In the ED, she was hemodynamically stable, CT revealed a 3mm UVJ stone as well as right hydroureteronephrosis and an indeterminate 1.2 cm liver lesion. Seen by urology. Recommending admission to medicine for medical management and pain control. CBC with microcytic anemia. Endorses that her sister had a kidney stone in the past.

## 2024-02-02 NOTE — ED ADULT NURSE NOTE - CHPI ED NUR SYMPTOMS NEG
no abdominal distension/no blood in stool/no burning urination/no diarrhea/no dysuria no abdominal distension/no blood in stool/no burning urination/no diarrhea/no dysuria/no fever/no hematuria

## 2024-02-02 NOTE — ED PROVIDER NOTE - OBJECTIVE STATEMENT
34-year-old female with no significant past medical history no surgical history presents emergency room for flank pain.  Patient reports right-sided lower abdominal and side/back pain that began 3 hours ago.  States pain is associated with nausea without vomiting, diarrhea.  Reports painful urination and to urinate this morning, states has not been able to urinate since and has pressure.  Denies any recent abnormal vaginal discharge, pelvic pain.  Denies chance of pregnancy.  Denies history of kidney stones.  States he took Tylenol prior to arrival with minimal relief.  Denies any heavy lifting, trauma or injury.

## 2024-02-03 ENCOUNTER — TRANSCRIPTION ENCOUNTER (OUTPATIENT)
Age: 34
End: 2024-02-03

## 2024-02-03 LAB
ANION GAP SERPL CALC-SCNC: 5 MMOL/L — SIGNIFICANT CHANGE UP (ref 5–17)
BUN SERPL-MCNC: 5 MG/DL — LOW (ref 7–23)
CALCIUM SERPL-MCNC: 8.1 MG/DL — LOW (ref 8.5–10.1)
CHLORIDE SERPL-SCNC: 109 MMOL/L — HIGH (ref 96–108)
CO2 SERPL-SCNC: 27 MMOL/L — SIGNIFICANT CHANGE UP (ref 22–31)
CREAT SERPL-MCNC: 0.38 MG/DL — LOW (ref 0.5–1.3)
CULTURE RESULTS: SIGNIFICANT CHANGE UP
EGFR: 135 ML/MIN/1.73M2 — SIGNIFICANT CHANGE UP
GLUCOSE SERPL-MCNC: 90 MG/DL — SIGNIFICANT CHANGE UP (ref 70–99)
HCT VFR BLD CALC: 28.7 % — LOW (ref 34.5–45)
HGB BLD-MCNC: 9.3 G/DL — LOW (ref 11.5–15.5)
IRON SATN MFR SERPL: 104 UG/DL — SIGNIFICANT CHANGE UP (ref 30–160)
IRON SATN MFR SERPL: 45 % — SIGNIFICANT CHANGE UP (ref 14–50)
MAGNESIUM SERPL-MCNC: 1.7 MG/DL — SIGNIFICANT CHANGE UP (ref 1.6–2.6)
MCHC RBC-ENTMCNC: 20.8 PG — LOW (ref 27–34)
MCHC RBC-ENTMCNC: 32.4 G/DL — SIGNIFICANT CHANGE UP (ref 32–36)
MCV RBC AUTO: 64.2 FL — LOW (ref 80–100)
NRBC # BLD: 0 /100 WBCS — SIGNIFICANT CHANGE UP (ref 0–0)
PHOSPHATE SERPL-MCNC: 3.4 MG/DL — SIGNIFICANT CHANGE UP (ref 2.5–4.5)
PLATELET # BLD AUTO: 269 K/UL — SIGNIFICANT CHANGE UP (ref 150–400)
POTASSIUM SERPL-MCNC: 3.2 MMOL/L — LOW (ref 3.5–5.3)
POTASSIUM SERPL-SCNC: 3.2 MMOL/L — LOW (ref 3.5–5.3)
RBC # BLD: 4.47 M/UL — SIGNIFICANT CHANGE UP (ref 3.8–5.2)
RBC # FLD: 15.5 % — HIGH (ref 10.3–14.5)
SODIUM SERPL-SCNC: 141 MMOL/L — SIGNIFICANT CHANGE UP (ref 135–145)
SPECIMEN SOURCE: SIGNIFICANT CHANGE UP
TIBC SERPL-MCNC: 233 UG/DL — SIGNIFICANT CHANGE UP (ref 220–430)
UIBC SERPL-MCNC: 129 UG/DL — SIGNIFICANT CHANGE UP (ref 110–370)
WBC # BLD: 8.89 K/UL — SIGNIFICANT CHANGE UP (ref 3.8–10.5)
WBC # FLD AUTO: 8.89 K/UL — SIGNIFICANT CHANGE UP (ref 3.8–10.5)

## 2024-02-03 PROCEDURE — 99232 SBSQ HOSP IP/OBS MODERATE 35: CPT

## 2024-02-03 PROCEDURE — 74420 UROGRAPHY RTRGR +-KUB: CPT | Mod: 26

## 2024-02-03 PROCEDURE — 52332 CYSTOSCOPY AND TREATMENT: CPT | Mod: 59,RT

## 2024-02-03 PROCEDURE — 52352 CYSTOURETERO W/STONE REMOVE: CPT | Mod: RT

## 2024-02-03 RX ORDER — KETOROLAC TROMETHAMINE 30 MG/ML
15 SYRINGE (ML) INJECTION ONCE
Refills: 0 | Status: DISCONTINUED | OUTPATIENT
Start: 2024-02-03 | End: 2024-02-03

## 2024-02-03 RX ORDER — ACETAMINOPHEN 500 MG
1000 TABLET ORAL ONCE
Refills: 0 | Status: COMPLETED | OUTPATIENT
Start: 2024-02-03 | End: 2024-02-03

## 2024-02-03 RX ORDER — ACETAMINOPHEN 500 MG
650 TABLET ORAL EVERY 6 HOURS
Refills: 0 | Status: DISCONTINUED | OUTPATIENT
Start: 2024-02-03 | End: 2024-02-04

## 2024-02-03 RX ORDER — MORPHINE SULFATE 50 MG/1
2 CAPSULE, EXTENDED RELEASE ORAL ONCE
Refills: 0 | Status: DISCONTINUED | OUTPATIENT
Start: 2024-02-03 | End: 2024-02-03

## 2024-02-03 RX ORDER — TAMSULOSIN HYDROCHLORIDE 0.4 MG/1
0.8 CAPSULE ORAL AT BEDTIME
Refills: 0 | Status: DISCONTINUED | OUTPATIENT
Start: 2024-02-03 | End: 2024-02-04

## 2024-02-03 RX ORDER — ONDANSETRON 8 MG/1
4 TABLET, FILM COATED ORAL EVERY 8 HOURS
Refills: 0 | Status: DISCONTINUED | OUTPATIENT
Start: 2024-02-03 | End: 2024-02-04

## 2024-02-03 RX ADMIN — ENOXAPARIN SODIUM 40 MILLIGRAM(S): 100 INJECTION SUBCUTANEOUS at 05:42

## 2024-02-03 RX ADMIN — MORPHINE SULFATE 2 MILLIGRAM(S): 50 CAPSULE, EXTENDED RELEASE ORAL at 20:31

## 2024-02-03 RX ADMIN — MORPHINE SULFATE 2 MILLIGRAM(S): 50 CAPSULE, EXTENDED RELEASE ORAL at 22:00

## 2024-02-03 RX ADMIN — MORPHINE SULFATE 2 MILLIGRAM(S): 50 CAPSULE, EXTENDED RELEASE ORAL at 10:38

## 2024-02-03 RX ADMIN — Medication 15 MILLIGRAM(S): at 17:09

## 2024-02-03 RX ADMIN — Medication 15 MILLIGRAM(S): at 17:43

## 2024-02-03 RX ADMIN — TAMSULOSIN HYDROCHLORIDE 0.8 MILLIGRAM(S): 0.4 CAPSULE ORAL at 21:24

## 2024-02-03 RX ADMIN — Medication 1000 MILLIGRAM(S): at 15:29

## 2024-02-03 RX ADMIN — ONDANSETRON 4 MILLIGRAM(S): 8 TABLET, FILM COATED ORAL at 21:33

## 2024-02-03 RX ADMIN — CEFTRIAXONE 100 MILLIGRAM(S): 500 INJECTION, POWDER, FOR SOLUTION INTRAMUSCULAR; INTRAVENOUS at 05:43

## 2024-02-03 RX ADMIN — Medication 325 MILLIGRAM(S): at 11:30

## 2024-02-03 RX ADMIN — MORPHINE SULFATE 2 MILLIGRAM(S): 50 CAPSULE, EXTENDED RELEASE ORAL at 11:38

## 2024-02-03 RX ADMIN — Medication 400 MILLIGRAM(S): at 14:29

## 2024-02-03 RX ADMIN — Medication 650 MILLIGRAM(S): at 22:55

## 2024-02-03 NOTE — PROGRESS NOTE ADULT - NS ATTEND AMEND GEN_ALL_CORE FT
Pt felt lousy and will likely need OR In am for URS, stone extraction and stent  Cx's pending  labs fine  On tamsulosin today as BP better w IV tylenol instead of morphine.  denies dizziness    Voiding well.  strain urine

## 2024-02-03 NOTE — PROGRESS NOTE ADULT - SUBJECTIVE AND OBJECTIVE BOX
Patient is a 34y old  Female who presents with a chief complaint of Nephrolithiasis (2024 19:46)    INTERVAL HPI/OVERNIGHT EVENTS:    MEDICATIONS  (STANDING):  cefTRIAXone   IVPB 1000 milliGRAM(s) IV Intermittent every 24 hours  chlorhexidine 2% Cloths 1 Application(s) Topical <User Schedule>  enoxaparin Injectable 40 milliGRAM(s) SubCutaneous every 24 hours  ferrous    sulfate 325 milliGRAM(s) Oral daily  influenza   Vaccine 0.5 milliLiter(s) IntraMuscular once  lactated ringers. 1000 milliLiter(s) (150 mL/Hr) IV Continuous <Continuous>  morphine  - Injectable 2 milliGRAM(s) IV Push once  tamsulosin 0.4 milliGRAM(s) Oral at bedtime    MEDICATIONS  (PRN):  morphine  - Injectable 2 milliGRAM(s) IV Push every 4 hours PRN Severe Pain (7 - 10)    Allergies    No Known Allergies    Intolerances      REVIEW OF SYSTEMS:  All other systems reviewed and are negative    Vital Signs Last 24 Hrs  T(C): 36.8 (2024 11:10), Max: 36.8 (2024 15:22)  T(F): 98.2 (2024 11:10), Max: 98.2 (2024 15:22)  HR: 96 (2024 11:10) (78 - 112)  BP: 109/72 (2024 11:10) (96/63 - 125/87)  BP(mean): --  RR: 18 (2024 11:10) (17 - 19)  SpO2: 98% (2024 11:10) (98% - 100%)    Parameters below as of 2024 11:10  Patient On (Oxygen Delivery Method): room air      Daily Height in cm: 160.02 (2024 21:43)    Daily Weight in k.5 (2024 05:00)  I&O's Summary    2024 07:01  -  2024 07:00  --------------------------------------------------------  IN: 1050 mL / OUT: 0 mL / NET: 1050 mL      CAPILLARY BLOOD GLUCOSE        PHYSICAL EXAM:  GENERAL: NAD,    HEAD:  Atraumatic, Normocephalic  EYES: EOMI, PERRLA, conjunctiva and sclera clear  ENMT: No tonsillar erythema, exudates, or enlargement; Moist mucous membranes, Good dentition, No lesions  NECK: Supple, No JVD, Normal thyroid  NERVOUS SYSTEM:  Alert & Oriented X3, Good concentration; Motor Strength 5/5 B/L upper and lower extremities; DTRs 2+ intact and symmetric  CHEST/LUNG: Clear to percussion bilaterally; No rales, rhonchi, wheezing, or rubs  HEART: Regular rate and rhythm; No murmurs, rubs, or gallops  ABDOMEN: Soft, Nontender, Nondistended; Bowel sounds present  EXTREMITIES:  2+ Peripheral Pulses, No clubbing, cyanosis, or edema  LYMPH: No lymphadenopathy noted  SKIN: No rashes or lesions    Labs                          9.3    8.89  )-----------( 269      ( 2024 07:31 )             28.7     02-03    141  |  109<H>  |  5<L>  ----------------------------<  90  3.2<L>   |  27  |  0.38<L>    Ca    8.1<L>      2024 07:31  Phos  3.4     02-03  Mg     1.7     02-03    TPro  7.8  /  Alb  4.1  /  TBili  0.8  /  DBili  x   /  AST  11<L>  /  ALT  16  /  AlkPhos  71  02-02    PT/INR - ( 2024 13:15 )   PT: 12.2 sec;   INR: 1.03 ratio         PTT - ( 2024 13:15 )  PTT:23.8 sec      Urinalysis Basic - ( 2024 07:31 )    Color: x / Appearance: x / SG: x / pH: x  Gluc: 90 mg/dL / Ketone: x  / Bili: x / Urobili: x   Blood: x / Protein: x / Nitrite: x   Leuk Esterase: x / RBC: x / WBC x   Sq Epi: x / Non Sq Epi: x / Bacteria: x                  DVT prophylaxis: > Lovenox 40mg SQ daily  > Heparin   > SCD's

## 2024-02-03 NOTE — PROGRESS NOTE ADULT - SUBJECTIVE AND OBJECTIVE BOX
34y Female admitted with FLANK PAIN    RENAL STONE    Patient seen and examined bedside resting comfortably, c/o persistent right flank pain and chill overnight, no fever.  Voiding spontaenously without difficulty, straining urine, no stone/sediments seen.   Denies hematuria and dysuria.  Denies nausea and vomiting. Tolerating diet.  Denies chest pain, dyspnea, cough.    T(F): 98.2 (02-03-24 @ 11:10), Max: 98.2 (02-02-24 @ 15:22)  HR: 96 (02-03-24 @ 11:10) (78 - 112)  BP: 109/72 (02-03-24 @ 11:10) (96/63 - 125/87)  RR: 18 (02-03-24 @ 11:10) (17 - 19)  SpO2: 98% (02-03-24 @ 11:10) (98% - 100%)  Wt(kg): --  CAPILLARY BLOOD GLUCOSE          Physical Exam:  General:  Appears stated age, uncomfortable 2/2 to pain.   Eyes: EOMI, conjunctiva clear  HENT:  WNL, no JVD  Chest: no respiratory distress, no accessory muscle use   Abdomen:  soft, nondistended, mild rlq tenderness, no rebound or guarding   : no suprapubic tenderness or distension, +right cvat   Neuro/Psych:  Alert, oriented to time, place and person     LABS:                        9.3    8.89  )-----------( 269      ( 03 Feb 2024 07:31 )             28.7   02-03    141  |  109<H>  |  5<L>  ----------------------------<  90  3.2<L>   |  27  |  0.38<L>    Ca    8.1<L>      03 Feb 2024 07:31  Phos  3.4     02-03  Mg     1.7     02-03    TPro  7.8  /  Alb  4.1  /  TBili  0.8  /  DBili  x   /  AST  11<L>  /  ALT  16  /  AlkPhos  71  02-02  PT/INR - ( 02 Feb 2024 13:15 )   PT: 12.2 sec;   INR: 1.03 ratio         PTT - ( 02 Feb 2024 13:15 )  PTT:23.8 sec  I&O's Detail    02 Feb 2024 07:01  -  03 Feb 2024 07:00  --------------------------------------------------------  IN:    IV PiggyBack: 50 mL    Lactated Ringers: 1000 mL  Total IN: 1050 mL    OUT:  Total OUT: 0 mL    Total NET: 1050 mL      A/P: 35 yo F with right flank pain found to have 3 mm calculi in R UVJ with moderate hydro  -cont MET  -c/w rocephin, Follow up Ucx  -IVFH, strain urine  - Flomax  -analgesics PRN  - if pain/ chills persists, or hypotensive will take pt to OR   -discussed with  Dr Mckay

## 2024-02-04 ENCOUNTER — RESULT REVIEW (OUTPATIENT)
Age: 34
End: 2024-02-04

## 2024-02-04 LAB
ANION GAP SERPL CALC-SCNC: 5 MMOL/L — SIGNIFICANT CHANGE UP (ref 5–17)
BUN SERPL-MCNC: 4 MG/DL — LOW (ref 7–23)
CALCIUM SERPL-MCNC: 8.2 MG/DL — LOW (ref 8.5–10.1)
CHLORIDE SERPL-SCNC: 108 MMOL/L — SIGNIFICANT CHANGE UP (ref 96–108)
CO2 SERPL-SCNC: 29 MMOL/L — SIGNIFICANT CHANGE UP (ref 22–31)
CREAT SERPL-MCNC: 0.31 MG/DL — LOW (ref 0.5–1.3)
EGFR: 142 ML/MIN/1.73M2 — SIGNIFICANT CHANGE UP
FERRITIN SERPL-MCNC: 115 NG/ML — SIGNIFICANT CHANGE UP (ref 15–150)
GLUCOSE SERPL-MCNC: 96 MG/DL — SIGNIFICANT CHANGE UP (ref 70–99)
HCG SERPL-ACNC: <1 MIU/ML — SIGNIFICANT CHANGE UP
HCT VFR BLD CALC: 27.7 % — LOW (ref 34.5–45)
HGB BLD-MCNC: 8.7 G/DL — LOW (ref 11.5–15.5)
MCHC RBC-ENTMCNC: 20.4 PG — LOW (ref 27–34)
MCHC RBC-ENTMCNC: 31.4 G/DL — LOW (ref 32–36)
MCV RBC AUTO: 64.9 FL — LOW (ref 80–100)
NRBC # BLD: 0 /100 WBCS — SIGNIFICANT CHANGE UP (ref 0–0)
PLATELET # BLD AUTO: 246 K/UL — SIGNIFICANT CHANGE UP (ref 150–400)
POTASSIUM SERPL-MCNC: 3.2 MMOL/L — LOW (ref 3.5–5.3)
POTASSIUM SERPL-SCNC: 3.2 MMOL/L — LOW (ref 3.5–5.3)
RBC # BLD: 4.27 M/UL — SIGNIFICANT CHANGE UP (ref 3.8–5.2)
RBC # FLD: 15.6 % — HIGH (ref 10.3–14.5)
SODIUM SERPL-SCNC: 142 MMOL/L — SIGNIFICANT CHANGE UP (ref 135–145)
WBC # BLD: 8.86 K/UL — SIGNIFICANT CHANGE UP (ref 3.8–10.5)
WBC # FLD AUTO: 8.86 K/UL — SIGNIFICANT CHANGE UP (ref 3.8–10.5)

## 2024-02-04 PROCEDURE — 99232 SBSQ HOSP IP/OBS MODERATE 35: CPT

## 2024-02-04 PROCEDURE — 88300 SURGICAL PATH GROSS: CPT | Mod: 26

## 2024-02-04 DEVICE — GUIDEWIRE SENSOR DUAL-FLEX NITINOL STRAIGHT .038" X 150CM: Type: IMPLANTABLE DEVICE | Site: RIGHT | Status: FUNCTIONAL

## 2024-02-04 DEVICE — URETERAL STENT PERCUFLEX PLUS 6FR 24CM: Type: IMPLANTABLE DEVICE | Site: RIGHT | Status: FUNCTIONAL

## 2024-02-04 DEVICE — IMPLANTABLE DEVICE: Type: IMPLANTABLE DEVICE | Site: RIGHT | Status: FUNCTIONAL

## 2024-02-04 DEVICE — URETERAL CATH OPEN END FLEXI-TIP 5FR .038" X 70CM: Type: IMPLANTABLE DEVICE | Site: RIGHT | Status: FUNCTIONAL

## 2024-02-04 DEVICE — GWIRE STR .035X150 STD: Type: IMPLANTABLE DEVICE | Site: RIGHT | Status: FUNCTIONAL

## 2024-02-04 DEVICE — AIRWAY BASKET ZERO TIP 12MM: Type: IMPLANTABLE DEVICE | Site: RIGHT | Status: FUNCTIONAL

## 2024-02-04 RX ORDER — MORPHINE SULFATE 50 MG/1
2 CAPSULE, EXTENDED RELEASE ORAL EVERY 4 HOURS
Refills: 0 | Status: DISCONTINUED | OUTPATIENT
Start: 2024-02-04 | End: 2024-02-05

## 2024-02-04 RX ORDER — HYDROMORPHONE HYDROCHLORIDE 2 MG/ML
0.5 INJECTION INTRAMUSCULAR; INTRAVENOUS; SUBCUTANEOUS
Refills: 0 | Status: DISCONTINUED | OUTPATIENT
Start: 2024-02-04 | End: 2024-02-05

## 2024-02-04 RX ORDER — ONDANSETRON 8 MG/1
4 TABLET, FILM COATED ORAL ONCE
Refills: 0 | Status: DISCONTINUED | OUTPATIENT
Start: 2024-02-04 | End: 2024-02-05

## 2024-02-04 RX ORDER — POTASSIUM CHLORIDE 20 MEQ
10 PACKET (EA) ORAL
Refills: 0 | Status: COMPLETED | OUTPATIENT
Start: 2024-02-04 | End: 2024-02-04

## 2024-02-04 RX ORDER — SODIUM CHLORIDE 9 MG/ML
1000 INJECTION, SOLUTION INTRAVENOUS
Refills: 0 | Status: DISCONTINUED | OUTPATIENT
Start: 2024-02-04 | End: 2024-02-05

## 2024-02-04 RX ORDER — TAMSULOSIN HYDROCHLORIDE 0.4 MG/1
0.4 CAPSULE ORAL AT BEDTIME
Refills: 0 | Status: DISCONTINUED | OUTPATIENT
Start: 2024-02-04 | End: 2024-02-05

## 2024-02-04 RX ORDER — CEFTRIAXONE 500 MG/1
1000 INJECTION, POWDER, FOR SOLUTION INTRAMUSCULAR; INTRAVENOUS EVERY 24 HOURS
Refills: 0 | Status: DISCONTINUED | OUTPATIENT
Start: 2024-02-04 | End: 2024-02-05

## 2024-02-04 RX ADMIN — TAMSULOSIN HYDROCHLORIDE 0.4 MILLIGRAM(S): 0.4 CAPSULE ORAL at 22:07

## 2024-02-04 RX ADMIN — SODIUM CHLORIDE 75 MILLILITER(S): 9 INJECTION, SOLUTION INTRAVENOUS at 15:02

## 2024-02-04 RX ADMIN — MORPHINE SULFATE 2 MILLIGRAM(S): 50 CAPSULE, EXTENDED RELEASE ORAL at 04:47

## 2024-02-04 RX ADMIN — MORPHINE SULFATE 2 MILLIGRAM(S): 50 CAPSULE, EXTENDED RELEASE ORAL at 00:31

## 2024-02-04 RX ADMIN — MORPHINE SULFATE 2 MILLIGRAM(S): 50 CAPSULE, EXTENDED RELEASE ORAL at 10:07

## 2024-02-04 RX ADMIN — MORPHINE SULFATE 2 MILLIGRAM(S): 50 CAPSULE, EXTENDED RELEASE ORAL at 09:06

## 2024-02-04 RX ADMIN — Medication 650 MILLIGRAM(S): at 11:02

## 2024-02-04 RX ADMIN — Medication 100 MILLIEQUIVALENT(S): at 09:07

## 2024-02-04 RX ADMIN — Medication 325 MILLIGRAM(S): at 11:20

## 2024-02-04 RX ADMIN — CEFTRIAXONE 100 MILLIGRAM(S): 500 INJECTION, POWDER, FOR SOLUTION INTRAMUSCULAR; INTRAVENOUS at 04:47

## 2024-02-04 RX ADMIN — MORPHINE SULFATE 2 MILLIGRAM(S): 50 CAPSULE, EXTENDED RELEASE ORAL at 02:00

## 2024-02-04 RX ADMIN — ENOXAPARIN SODIUM 40 MILLIGRAM(S): 100 INJECTION SUBCUTANEOUS at 04:46

## 2024-02-04 RX ADMIN — Medication 650 MILLIGRAM(S): at 12:02

## 2024-02-04 RX ADMIN — Medication 650 MILLIGRAM(S): at 00:00

## 2024-02-04 RX ADMIN — MORPHINE SULFATE 2 MILLIGRAM(S): 50 CAPSULE, EXTENDED RELEASE ORAL at 22:08

## 2024-02-04 RX ADMIN — Medication 100 MILLIEQUIVALENT(S): at 15:02

## 2024-02-04 RX ADMIN — Medication 100 MILLIEQUIVALENT(S): at 11:19

## 2024-02-04 RX ADMIN — SODIUM CHLORIDE 75 MILLILITER(S): 9 INJECTION, SOLUTION INTRAVENOUS at 14:08

## 2024-02-04 RX ADMIN — ONDANSETRON 4 MILLIGRAM(S): 8 TABLET, FILM COATED ORAL at 09:50

## 2024-02-04 NOTE — PROGRESS NOTE ADULT - SUBJECTIVE AND OBJECTIVE BOX
S/P Cystoscopy and right ureteral stent placement  34y year old Female seen and examined at bedside. Admits to abdominal pain, well controlled with medication. Denies chest pain, shortness of breath, nausea/vomiting.    Vital Signs Last 24 Hrs  T(F): 97.9 (02-04-24 @ 14:45), Max: 98.6 (02-04-24 @ 14:10)  HR: 86 (02-04-24 @ 14:45)  BP: 103/72 (02-04-24 @ 14:45)  RR: 18 (02-04-24 @ 14:45)  SpO2: 99% (02-04-24 @ 14:45)  Wt(kg): --   CAPILLARY BLOOD GLUCOSE          GENERAL: Alert, NAD  CHEST/LUNG: Clear to auscultation bilaterally, respirations nonlabored  HEART: +S1S2, regular rate and rhythm  ABDOMEN: soft, nondistended. Appropriate incisional tenderness. Dressings clean dry intact x 4 over trocar sites  EXTREMITIES:  no calf tenderness, no edema. Intermittent pneumatic compression devices b/l LE  : Mild sp and right cva tenderness

## 2024-02-04 NOTE — BRIEF OPERATIVE NOTE - NSICDXBRIEFPROCEDURE_GEN_ALL_CORE_FT
PROCEDURES:  Cystoscopy with ureteroscopy and manipulation of calculus 04-Feb-2024 22:27:51  Estela Mckay  Cystoscopy with ureteroscopy with extraction of calculus using stone retrieval basket 04-Feb-2024 22:28:22  Estela Mckay

## 2024-02-04 NOTE — BRIEF OPERATIVE NOTE - NSICDXBRIEFPOSTOP_GEN_ALL_CORE_FT
POST-OP DIAGNOSIS:  Hydronephrosis, right 04-Feb-2024 22:30:07  Estela Mckay  Right ureteral calculus 04-Feb-2024 22:30:47  Estela Mckay  Renal calculus, right 04-Feb-2024 22:31:28  Estela Mckay

## 2024-02-04 NOTE — PROGRESS NOTE ADULT - SUBJECTIVE AND OBJECTIVE BOX
Patient is a 34y old  Female who presents with a chief complaint of Nephrolithiasis (2024 11:57)    INTERVAL HPI/OVERNIGHT EVENTS:    MEDICATIONS  (STANDING):  cefTRIAXone   IVPB 1000 milliGRAM(s) IV Intermittent every 24 hours  chlorhexidine 2% Cloths 1 Application(s) Topical <User Schedule>  enoxaparin Injectable 40 milliGRAM(s) SubCutaneous every 24 hours  ferrous    sulfate 325 milliGRAM(s) Oral daily  influenza   Vaccine 0.5 milliLiter(s) IntraMuscular once  lactated ringers. 1000 milliLiter(s) (150 mL/Hr) IV Continuous <Continuous>  potassium chloride  10 mEq/100 mL IVPB 10 milliEquivalent(s) IV Intermittent every 1 hour  tamsulosin 0.8 milliGRAM(s) Oral at bedtime    MEDICATIONS  (PRN):  acetaminophen     Tablet .. 650 milliGRAM(s) Oral every 6 hours PRN Mild Pain (1 - 3)  morphine  - Injectable 2 milliGRAM(s) IV Push every 4 hours PRN Severe Pain (7 - 10)  ondansetron Injectable 4 milliGRAM(s) IV Push every 8 hours PRN Nausea and/or Vomiting    Allergies    No Known Allergies    Intolerances      REVIEW OF SYSTEMS:  All other systems reviewed and are negative    Vital Signs Last 24 Hrs  T(C): 36.6 (2024 04:57), Max: 36.9 (2024 17:27)  T(F): 97.9 (2024 04:57), Max: 98.4 (2024 17:27)  HR: 93 (2024 09:03) (92 - 108)  BP: 108/73 (2024 09:03) (103/70 - 112/78)  BP(mean): --  RR: 18 (2024 04:57) (16 - 18)  SpO2: 99% (2024 04:57) (99% - 100%)    Parameters below as of 2024 04:57  Patient On (Oxygen Delivery Method): room air      Daily Height in cm: 160 (2024 09:28)    Daily Weight in k.6 (2024 04:57)  I&O's Summary    CAPILLARY BLOOD GLUCOSE        PHYSICAL EXAM:  GENERAL: NAD,    HEAD:  Atraumatic, Normocephalic  EYES: EOMI, PERRLA, conjunctiva and sclera clear  ENMT: No tonsillar erythema, exudates, or enlargement; Moist mucous membranes, Good dentition, No lesions  NECK: Supple, No JVD, Normal thyroid  NERVOUS SYSTEM:  Alert & Oriented X3, Good concentration; Motor Strength 5/5 B/L upper and lower extremities; DTRs 2+ intact and symmetric  CHEST/LUNG: Clear to percussion bilaterally; No rales, rhonchi, wheezing, or rubs  HEART: Regular rate and rhythm; No murmurs, rubs, or gallops  ABDOMEN: Soft, Nontender, Nondistended; Bowel sounds present  EXTREMITIES:  2+ Peripheral Pulses, No clubbing, cyanosis, or edema  LYMPH: No lymphadenopathy noted  SKIN: No rashes or lesions    Labs                          8.7    8.86  )-----------( 246      ( 2024 06:40 )             27.7     02-04    142  |  108  |  4<L>  ----------------------------<  96  3.2<L>   |  29  |  0.31<L>    Ca    8.2<L>      2024 06:40  Phos  3.4     02-03  Mg     1.7     02-03    TPro  7.8  /  Alb  4.1  /  TBili  0.8  /  DBili  x   /  AST  11<L>  /  ALT  16  /  AlkPhos  71  02-02    PT/INR - ( 2024 13:15 )   PT: 12.2 sec;   INR: 1.03 ratio         PTT - ( 2024 13:15 )  PTT:23.8 sec      Urinalysis Basic - ( 2024 06:40 )    Color: x / Appearance: x / SG: x / pH: x  Gluc: 96 mg/dL / Ketone: x  / Bili: x / Urobili: x   Blood: x / Protein: x / Nitrite: x   Leuk Esterase: x / RBC: x / WBC x   Sq Epi: x / Non Sq Epi: x / Bacteria: x        Culture - Urine (collected 2024 17:30)  Source: Clean Catch Clean Catch (Midstream)  Final Report (2024 23:13):    <10,000 CFU/mL Normal Urogenital Alma Delia                DVT prophylaxis: > Lovenox 40mg SQ daily  > Heparin   > SCD's

## 2024-02-04 NOTE — BRIEF OPERATIVE NOTE - NSICDXBRIEFPREOP_GEN_ALL_CORE_FT
PRE-OP DIAGNOSIS:  Hydronephrosis, right 04-Feb-2024 22:28:40  Estela Mckay  Right ureteral stone 04-Feb-2024 22:28:56  Estela Mckay  Renal calculus, right 04-Feb-2024 22:29:34  Estela Mckay

## 2024-02-05 ENCOUNTER — TRANSCRIPTION ENCOUNTER (OUTPATIENT)
Age: 34
End: 2024-02-05

## 2024-02-05 VITALS
DIASTOLIC BLOOD PRESSURE: 74 MMHG | SYSTOLIC BLOOD PRESSURE: 106 MMHG | TEMPERATURE: 100 F | OXYGEN SATURATION: 99 % | HEART RATE: 92 BPM | RESPIRATION RATE: 16 BRPM

## 2024-02-05 LAB
ANION GAP SERPL CALC-SCNC: 6 MMOL/L — SIGNIFICANT CHANGE UP (ref 5–17)
BUN SERPL-MCNC: 4 MG/DL — LOW (ref 7–23)
CALCIUM SERPL-MCNC: 8.6 MG/DL — SIGNIFICANT CHANGE UP (ref 8.5–10.1)
CHLORIDE SERPL-SCNC: 108 MMOL/L — SIGNIFICANT CHANGE UP (ref 96–108)
CO2 SERPL-SCNC: 29 MMOL/L — SIGNIFICANT CHANGE UP (ref 22–31)
CREAT SERPL-MCNC: 0.32 MG/DL — LOW (ref 0.5–1.3)
EGFR: 140 ML/MIN/1.73M2 — SIGNIFICANT CHANGE UP
GLUCOSE SERPL-MCNC: 118 MG/DL — HIGH (ref 70–99)
HCT VFR BLD CALC: 28.6 % — LOW (ref 34.5–45)
HGB BLD-MCNC: 9.3 G/DL — LOW (ref 11.5–15.5)
MCHC RBC-ENTMCNC: 20.8 PG — LOW (ref 27–34)
MCHC RBC-ENTMCNC: 32.5 G/DL — SIGNIFICANT CHANGE UP (ref 32–36)
MCV RBC AUTO: 64 FL — LOW (ref 80–100)
NRBC # BLD: 0 /100 WBCS — SIGNIFICANT CHANGE UP (ref 0–0)
PLATELET # BLD AUTO: 262 K/UL — SIGNIFICANT CHANGE UP (ref 150–400)
POTASSIUM SERPL-MCNC: 3.5 MMOL/L — SIGNIFICANT CHANGE UP (ref 3.5–5.3)
POTASSIUM SERPL-SCNC: 3.5 MMOL/L — SIGNIFICANT CHANGE UP (ref 3.5–5.3)
RBC # BLD: 4.47 M/UL — SIGNIFICANT CHANGE UP (ref 3.8–5.2)
RBC # FLD: 15.6 % — HIGH (ref 10.3–14.5)
SODIUM SERPL-SCNC: 143 MMOL/L — SIGNIFICANT CHANGE UP (ref 135–145)
WBC # BLD: 6.89 K/UL — SIGNIFICANT CHANGE UP (ref 3.8–10.5)
WBC # FLD AUTO: 6.89 K/UL — SIGNIFICANT CHANGE UP (ref 3.8–10.5)

## 2024-02-05 PROCEDURE — 99232 SBSQ HOSP IP/OBS MODERATE 35: CPT

## 2024-02-05 PROCEDURE — 99239 HOSP IP/OBS DSCHRG MGMT >30: CPT

## 2024-02-05 RX ORDER — OXYCODONE AND ACETAMINOPHEN 5; 325 MG/1; MG/1
1 TABLET ORAL
Qty: 12 | Refills: 0
Start: 2024-02-05 | End: 2024-02-08

## 2024-02-05 RX ORDER — ACETAMINOPHEN 500 MG
975 TABLET ORAL EVERY 8 HOURS
Refills: 0 | Status: DISCONTINUED | OUTPATIENT
Start: 2024-02-05 | End: 2024-02-05

## 2024-02-05 RX ORDER — FERROUS SULFATE 325(65) MG
1 TABLET ORAL
Qty: 0 | Refills: 0 | DISCHARGE
Start: 2024-02-05

## 2024-02-05 RX ORDER — ACETAMINOPHEN 500 MG
3 TABLET ORAL
Qty: 0 | Refills: 0 | DISCHARGE
Start: 2024-02-05

## 2024-02-05 RX ORDER — FERROUS SULFATE 325(65) MG
325 TABLET ORAL
Refills: 0 | Status: DISCONTINUED | OUTPATIENT
Start: 2024-02-05 | End: 2024-02-05

## 2024-02-05 RX ORDER — SENNA PLUS 8.6 MG/1
2 TABLET ORAL AT BEDTIME
Refills: 0 | Status: DISCONTINUED | OUTPATIENT
Start: 2024-02-05 | End: 2024-02-05

## 2024-02-05 RX ORDER — CIPROFLOXACIN LACTATE 400MG/40ML
1 VIAL (ML) INTRAVENOUS
Qty: 6 | Refills: 0
Start: 2024-02-05 | End: 2024-02-07

## 2024-02-05 RX ORDER — TAMSULOSIN HYDROCHLORIDE 0.4 MG/1
1 CAPSULE ORAL
Qty: 14 | Refills: 0
Start: 2024-02-05 | End: 2024-02-18

## 2024-02-05 RX ORDER — POTASSIUM CHLORIDE 20 MEQ
40 PACKET (EA) ORAL EVERY 4 HOURS
Refills: 0 | Status: DISCONTINUED | OUTPATIENT
Start: 2024-02-05 | End: 2024-02-05

## 2024-02-05 RX ADMIN — CEFTRIAXONE 100 MILLIGRAM(S): 500 INJECTION, POWDER, FOR SOLUTION INTRAMUSCULAR; INTRAVENOUS at 03:35

## 2024-02-05 RX ADMIN — MORPHINE SULFATE 2 MILLIGRAM(S): 50 CAPSULE, EXTENDED RELEASE ORAL at 09:31

## 2024-02-05 RX ADMIN — MORPHINE SULFATE 2 MILLIGRAM(S): 50 CAPSULE, EXTENDED RELEASE ORAL at 10:31

## 2024-02-05 NOTE — PROGRESS NOTE ADULT - ASSESSMENT
MERA LIU is a 34y Female with a past medical history as described above who presented for evaluation of R flank pain. Found to have UVJ stone.     # UVJ stone  - Continue pain control, morphine 2mg IV q4h PRN, adjust as necessary, if BP too soft can do Toradol   - Ceftriaxone 1g IV daily   - Flomax 0.4mg daily   - Urology recs appreciated,, plan for stent  - Strain urine for stone     # UTI  - UA positive for WBCs  - Ceftriaxone 1g daily   - Follow-up cultures     # Microcytic anemia   - Iron studies   - Possible menstrual losses   - Start oral iron     DVT ppx: Lovenox 40 mg SubQ daily   GI ppx: Pepcid 20mg daily   Diet: Regular   Activity: IAT   Dispo: From home   Code: FULL 
MERA LIU is a 34y Female with a past medical history as described above who presented for evaluation of R flank pain. Found to have UVJ stone.     # UVJ stone  - Continue pain control, morphine 2mg IV q4h PRN, adjust as necessary, if BP too soft can do Toradol   - Ceftriaxone 1g IV daily   - Flomax 0.4mg daily   - Urology recs appreciated  - Strain urine for stone     # UTI  - UA positive for WBCs  - Ceftriaxone 1g daily   - Follow-up cultures     # Microcytic anemia   - Iron studies   - Possible menstrual losses   - Start oral iron     DVT ppx: Lovenox 40 mg SubQ daily   GI ppx: Pepcid 20mg daily   Diet: Regular   Activity: IAT   Dispo: From home   Code: FULL 
33 yo F with right flank pain found to have 3 mm calculi in R UVJ with moderate hydro, S/P Cystoscopy and right ureteral stent placement POD 1    -Reg diet, Can be discharged today or tomorrow with 5 days of cefdinir, outpt f/u with Dr Mckay for stent removal in 2 weeks   -c/w rocephin, Follow up Ucx  -IVF  - Flomax  -analgesics PRN  -discussed with  Dr Mckay 
MERA LIU is a 34y Female with a past medical history as described above who presented for evaluation of R flank pain. Found to have UVJ stone.     # UVJ stone  - Continue pain control, morphine 2mg IV q4h PRN, adjust as necessary, if BP too soft can do Toradol   - Ceftriaxone 1g IV daily   - Flomax 0.4mg daily   - Urology recs appreciated,, plan for stent  - Strain urine for stone     # UTI  - UA positive for WBCs  - Ceftriaxone 1g daily   - Follow-up cultures     # Microcytic anemia   - Iron studies   - Possible menstrual losses   - Start oral iron     DVT ppx: Lovenox 40 mg SubQ daily   GI ppx: Pepcid 20mg daily   Diet: Regular   Activity: IAT   Dispo: From home   Code: FULL

## 2024-02-05 NOTE — CHART NOTE - NSCHARTNOTEFT_GEN_A_CORE
Pt seen for consult for MST >2 (unsure of wt loss)  Pt c no significant PMHx admitted c right flank pain; found c nephrolithiasis s/p right stent placement on 2/4  Pt on regular diet; consuming 50-75% most meals; pt refused nutritional supplement  No physical signs of malnutrition observed; pt denies any wt loss      Consult received for "MST Score =/>2.   Upon chart review patient with stable weight, does not currently meet criteria for Protein Calorie Malnutrition risk per MST.   RD remains available for assessment per protocol or as needed.   Leia Sanders, MS, RD, CDN   [phone]

## 2024-02-05 NOTE — DISCHARGE NOTE NURSING/CASE MANAGEMENT/SOCIAL WORK - NSDCFUADDAPPT_GEN_ALL_CORE_FT
It is important to see your primary physician as well as other necessary consultants within the next week to perform a comprehensive medical review.  Call their offices for an appointment as soon as you leave the hospital.  If you do not have a primary physician or cant reach him/her, contact the [facility] Physician Referral Service at [phone].  Your medical issues appear to be stable at this time, but if your symptoms recur or worsen, contact your physicians and/or return to the hospital if necessary.  If you encounter any issues or questions with your medication, call your physicians before stopping the medication.

## 2024-02-05 NOTE — DISCHARGE NOTE PROVIDER - NSDCFUADDAPPT_GEN_ALL_CORE_FT
It is important to see your primary physician as well as other necessary consultants within the next week to perform a comprehensive medical review.  Call their offices for an appointment as soon as you leave the hospital.  If you do not have a primary physician or cant reach him/her, contact the [facility] Physician Referral Service at [phone].  Your medical issues appear to be stable at this time, but if your symptoms recur or worsen, contact your physicians and/or return to the hospital if necessary.  If you encounter any issues or questions with your medication, call your physicians before stopping the medication.

## 2024-02-05 NOTE — PROVIDER CONTACT NOTE (OTHER) - ACTION/TREATMENT ORDERED:
Surgical PA made aware. STAT CBC & BMP ordered. Bladder scan done and was negative. Continue to monitor.

## 2024-02-05 NOTE — DISCHARGE NOTE PROVIDER - NSDCMRMEDTOKEN_GEN_ALL_CORE_FT
acetaminophen 325 mg oral tablet: 3 tab(s) orally every 8 hours as needed for  mild pain  ciprofloxacin 500 mg oral tablet: 1 tab(s) orally 2 times a day  ferrous sulfate 325 mg (65 mg elemental iron) oral tablet: 1 tab(s) orally 3 times a week  Percocet 5 mg-325 mg oral tablet: 1 tab(s) orally every 8 hours as needed for  severe pain MDD: 3 tabs  tamsulosin 0.4 mg oral capsule: 1 cap(s) orally once a day (at bedtime)

## 2024-02-05 NOTE — PROGRESS NOTE ADULT - SUBJECTIVE AND OBJECTIVE BOX
Patient seen and examined bedside, complaining of lower abd pain and flank pain, has improved.   states having urinary incontinence, unable to control bladder. with hematuria and dysuria  pt's lmp 1/18/24  Denies nausea vomiting, diarrhea, fevers, chills  Tolerating diet.      T(F): 99.5 (02-05-24 @ 10:30), Max: 99.5 (02-05-24 @ 10:30)  HR: 92 (02-05-24 @ 10:30) (84 - 110)  BP: 106/74 (02-05-24 @ 10:30) (97/63 - 117/91)  RR: 16 (02-05-24 @ 10:30) (13 - 20)  SpO2: 99% (02-05-24 @ 10:30) (97% - 100%)  Wt(kg): --  CAPILLARY BLOOD GLUCOSE          PHYSICAL EXAM:  General: NAD, alert and awake  HEENT: NCAT, EOMI, conjunctiva clear  Chest: nonlabored respirations, good inspiratory effort  Abdomen: soft, nondistended, mild lower abd tenderness  Extremities: no pedal edema or calf tenderness noted   : + right mild CVAT, noted fruit punch hematuria via purewick     LABS:                        9.3    6.89  )-----------( 262      ( 05 Feb 2024 10:00 )             28.6   02-05    143  |  108  |  4<L>  ----------------------------<  118<H>  3.5   |  29  |  0.32<L>    Ca    8.6      05 Feb 2024 10:00      I&O's Detail    04 Feb 2024 07:01  -  05 Feb 2024 07:00  --------------------------------------------------------  IN:  Total IN: 0 mL    OUT:    Voided (mL): 1750 mL  Total OUT: 1750 mL    Total NET: -1750 mL        A/P: 35 yo F with right flank pain found to have 3 mm calculi in R UVJ with moderate hydro, POD1 S/P Cystoscopy and right ureteral stent placement   with hematuria, H/H stable  -c/w rocephin, Follow up Ucx  -IVF  - Flomax  -analgesics PRN     Patient seen and examined bedside, complaining of lower abd pain and flank pain, has improved.   states having urinary incontinence, unable to control bladder. with hematuria and dysuria  pt's lmp 1/18/24  Denies nausea vomiting, diarrhea, fevers, chills  Tolerating diet.      T(F): 99.5 (02-05-24 @ 10:30), Max: 99.5 (02-05-24 @ 10:30)  HR: 92 (02-05-24 @ 10:30) (84 - 110)  BP: 106/74 (02-05-24 @ 10:30) (97/63 - 117/91)  RR: 16 (02-05-24 @ 10:30) (13 - 20)  SpO2: 99% (02-05-24 @ 10:30) (97% - 100%)  Wt(kg): --  CAPILLARY BLOOD GLUCOSE          PHYSICAL EXAM:  General: NAD, alert and awake  HEENT: NCAT, EOMI, conjunctiva clear  Chest: nonlabored respirations, good inspiratory effort  Abdomen: soft, nondistended, mild lower abd tenderness  Extremities: no pedal edema or calf tenderness noted   : + right mild CVAT, noted fruit punch hematuria via purewick     LABS:                        9.3    6.89  )-----------( 262      ( 05 Feb 2024 10:00 )             28.6   02-05    143  |  108  |  4<L>  ----------------------------<  118<H>  3.5   |  29  |  0.32<L>    Ca    8.6      05 Feb 2024 10:00      I&O's Detail    04 Feb 2024 07:01  -  05 Feb 2024 07:00  --------------------------------------------------------  IN:  Total IN: 0 mL    OUT:    Voided (mL): 1750 mL  Total OUT: 1750 mL    Total NET: -1750 mL        A/P: 35 yo F with right flank pain found to have 3 mm calculi in R UVJ with moderate hydro, POD1 S/P Cystoscopy, right ureteroscopy with stone removal and right ureteral stent placement   pt is stable for d/c from a urological standpoint with 3 days of cipro and c/w flomax until stent removed outpt in office with Dr Mckay in 1 month

## 2024-02-05 NOTE — DISCHARGE NOTE PROVIDER - PROVIDER TOKENS
PROVIDER:[TOKEN:[[id number]],FOLLOWUP:[1 month]],FREE:[LAST:[your primary care doctor],PHONE:[(   )    -],FAX:[(   )    -],FOLLOWUP:[1 week]]

## 2024-02-05 NOTE — DISCHARGE NOTE NURSING/CASE MANAGEMENT/SOCIAL WORK - PATIENT PORTAL LINK FT
You can access the FollowMyHealth Patient Portal offered by [facility] by registering at the following website: [email or web address]. By joining [employer]’s FollowMyHealth portal, you will also be able to view your health information using other applications (apps) compatible with our system.

## 2024-02-05 NOTE — DISCHARGE NOTE PROVIDER - HOSPITAL COURSE
34y Female with no PMH  who presented for evaluation of R flank pain. Found to have UVJ stone.     Vital Signs Last 24 Hrs  T(C): 37.5 (05 Feb 2024 10:30), Max: 37.5 (05 Feb 2024 10:30)  T(F): 99.5 (05 Feb 2024 10:30), Max: 99.5 (05 Feb 2024 10:30)  HR: 92 (05 Feb 2024 10:30) (84 - 92)  BP: 106/74 (05 Feb 2024 10:30) (97/63 - 112/74)  BP(mean): --  RR: 16 (05 Feb 2024 10:30) (16 - 18)  SpO2: 99% (05 Feb 2024 10:30) (97% - 100%)    Parameters below as of 05 Feb 2024 10:30  Patient On (Oxygen Delivery Method): room air        # UVJ stone  - Flomax 0.4mg daily   - Urology recs appreciated   --per urology, ciprofloxacin x 3 more days   outpatient follow-up with Dr. Mckay     # UTI       # Microcytic anemia        34y Female with no PMH  who presented for evaluation of R flank pain  found to have 3 mm calculi in R UVJ with moderate hydro. Seen by urology, S/P Cystoscopy, right ureteroscopy with stone removal and right ureteral stent placement 2/4/24. Patient was discharged on 3 more days of Ciprofloxacin and flomax. Outpatient follow-up with Dr. Mckay in 1 month.     Vital Signs Last 24 Hrs  T(C): 37.5 (05 Feb 2024 10:30), Max: 37.5 (05 Feb 2024 10:30)  T(F): 99.5 (05 Feb 2024 10:30), Max: 99.5 (05 Feb 2024 10:30)  HR: 92 (05 Feb 2024 10:30) (84 - 92)  BP: 106/74 (05 Feb 2024 10:30) (97/63 - 112/74)  BP(mean): --  RR: 16 (05 Feb 2024 10:30) (16 - 18)  SpO2: 99% (05 Feb 2024 10:30) (97% - 100%)  Parameters below as of 05 Feb 2024 10:30  Patient On (Oxygen Delivery Method): room air  GENERAL: NAD, no increased WOB  NERVOUS SYSTEM:  Alert & Oriented X3, Good concentration; nonfocal   CHEST/LUNG: CTAB;  No rales, rhonchi, wheezing, or rubs  HEART: Regular rate and rhythm; No murmurs, rubs, or gallops  ABDOMEN: Soft, Nontender, Nondistended; Bowel sounds present  EXTREMITIES:  2+ Peripheral Pulses b/l, No clubbing, cyanosis, calf tenderness or edema b/l      DISCHARGE DIAGNOSES:   3 mm calculi in R UVJ with moderate hydro, see above   has some hematuria and dysuria which is normal per urology, will get better over the next several days   H/H stable   outpatient follow-up with PMD within 1 week  pain control PRN     MICROCYTIC ANEMIA, likely WILEY due to menstruation vs underlying thalassemia, can be worked up outpatient   iron supplement     Indeterminate right hepatic lobe hypoattenuating foci,1.2 cm and 1.1 cm. >>Recommend further evaluation with MRI abdomen with IV contrast as an outpatient   discussed with patient, she is aware     Hypokalemia >>repleted     Discharge time : 40 min   RETURN PARAMETERS DISCUSSED WITH PATIENT, PATIENT EXPRESSED UNDERSTANDING AND IS AGREEABLE.    Care plan and all findings were discussed in detail with patient.  All questions and concerns addressed

## 2024-02-05 NOTE — DISCHARGE NOTE PROVIDER - CARE PROVIDER_API CALL
Estela Mckay  Urology  [address] [id number]  Phone: [phone]  Fax: [phone]  Follow Up Time: 1 month    your primary care doctor,   Phone: (   )    -  Fax: (   )    -  Follow Up Time: 1 week

## 2024-02-05 NOTE — PROGRESS NOTE ADULT - SUBJECTIVE AND OBJECTIVE BOX
Patient is a 34y old  Female who presents with a chief complaint of Nephrolithiasis (03 Feb 2024 11:57)    INTERVAL HPI/OVERNIGHT EVENTS:  Patient seen and examined bedside.   no overnight events   this AM had episodes of urinary incontinence with hematuria and clots   remains afebrile , WBC wnl     REVIEW OF SYSTEMS: remaining ROS negative     MEDICATIONS  (STANDING):  cefTRIAXone   IVPB 1000 milliGRAM(s) IV Intermittent every 24 hours  chlorhexidine 2% Cloths 1 Application(s) Topical <User Schedule>  enoxaparin Injectable 40 milliGRAM(s) SubCutaneous every 24 hours  ferrous    sulfate 325 milliGRAM(s) Oral daily  influenza   Vaccine 0.5 milliLiter(s) IntraMuscular once  lactated ringers. 1000 milliLiter(s) (150 mL/Hr) IV Continuous <Continuous>  potassium chloride  10 mEq/100 mL IVPB 10 milliEquivalent(s) IV Intermittent every 1 hour  tamsulosin 0.8 milliGRAM(s) Oral at bedtime    MEDICATIONS  (PRN):  acetaminophen     Tablet .. 650 milliGRAM(s) Oral every 6 hours PRN Mild Pain (1 - 3)  morphine  - Injectable 2 milliGRAM(s) IV Push every 4 hours PRN Severe Pain (7 - 10)  ondansetron Injectable 4 milliGRAM(s) IV Push every 8 hours PRN Nausea and/or Vomiting    Allergies    No Known Allergies    Intolerances        Vital Signs Last 24 Hrs  T(C): 37.5 (05 Feb 2024 10:30), Max: 37.5 (05 Feb 2024 10:30)  T(F): 99.5 (05 Feb 2024 10:30), Max: 99.5 (05 Feb 2024 10:30)  HR: 92 (05 Feb 2024 10:30) (84 - 110)  BP: 106/74 (05 Feb 2024 10:30) (97/63 - 117/91)  BP(mean): --  RR: 16 (05 Feb 2024 10:30) (13 - 20)  SpO2: 99% (05 Feb 2024 10:30) (97% - 100%)    Parameters below as of 05 Feb 2024 10:30  Patient On (Oxygen Delivery Method): room air            PHYSICAL EXAM:  GENERAL: NAD, no increased WOB  HEAD:  Atraumatic, Normocephalic  EYES: EOMI, PERRLA, conjunctiva and sclera clear  ENMT: No tonsillar erythema, exudates, or enlargement; Moist mucous membranes, Good dentition, No lesions  NECK: Supple, No JVD, Normal thyroid  NERVOUS SYSTEM:  Alert & Oriented X3, Good concentration; Motor Strength 5/5 B/L upper and lower extremities; DTRs 2+ intact and symmetric  CHEST/LUNG: CTAB;  No rales, rhonchi, wheezing, or rubs  HEART: Regular rate and rhythm; No murmurs, rubs, or gallops  ABDOMEN: Soft, Nontender, Nondistended; Bowel sounds present  EXTREMITIES:  2+ Peripheral Pulses b/l, No clubbing, cyanosis, or edema b/l         Labs                                     9.3    6.89  )-----------( 262      ( 05 Feb 2024 10:00 )             28.6   02-05    143  |  108  |  4<L>  ----------------------------<  118<H>  3.5   |  29  |  0.32<L>    Ca    8.6      05 Feb 2024 10:00           PTT - ( 02 Feb 2024 13:15 )  PTT:23.8 sec      Urinalysis Basic - ( 04 Feb 2024 06:40 )    Color: x / Appearance: x / SG: x / pH: x  Gluc: 96 mg/dL / Ketone: x  / Bili: x / Urobili: x   Blood: x / Protein: x / Nitrite: x   Leuk Esterase: x / RBC: x / WBC x   Sq Epi: x / Non Sq Epi: x / Bacteria: x        Culture - Urine (collected 02 Feb 2024 17:30)  Source: Clean Catch Clean Catch (Midstream)  Final Report (03 Feb 2024 23:13):    <10,000 CFU/mL Normal Urogenital Alma Delia    Consultant(s) Notes Reveiwed [ x] Yes     Care Discussed with [ x] Consultants  [x] Patient  [ ] Family  [x ] /   [ x] Other; RN

## 2024-02-06 PROBLEM — N20.0 CALCULUS OF KIDNEY: Chronic | Status: ACTIVE | Noted: 2024-02-02

## 2024-02-09 DIAGNOSIS — E87.6 HYPOKALEMIA: ICD-10-CM

## 2024-02-09 DIAGNOSIS — N20.0 CALCULUS OF KIDNEY: ICD-10-CM

## 2024-02-09 DIAGNOSIS — N13.6 PYONEPHROSIS: ICD-10-CM

## 2024-02-09 DIAGNOSIS — D50.9 IRON DEFICIENCY ANEMIA, UNSPECIFIED: ICD-10-CM

## 2024-02-12 LAB
CELL MATERIAL STONE EST-MCNT: SIGNIFICANT CHANGE UP
LABORATORY COMMENT REPORT: SIGNIFICANT CHANGE UP
NIDUS STONE QN: SIGNIFICANT CHANGE UP

## 2024-02-13 LAB — SURGICAL PATHOLOGY STUDY: SIGNIFICANT CHANGE UP

## 2024-02-14 ENCOUNTER — APPOINTMENT (OUTPATIENT)
Dept: UROLOGY | Facility: CLINIC | Age: 34
End: 2024-02-14
Payer: MEDICAID

## 2024-02-14 ENCOUNTER — OUTPATIENT (OUTPATIENT)
Dept: OUTPATIENT SERVICES | Facility: HOSPITAL | Age: 34
LOS: 1 days | End: 2024-02-14
Payer: MEDICAID

## 2024-02-14 VITALS
OXYGEN SATURATION: 100 % | RESPIRATION RATE: 16 BRPM | HEART RATE: 88 BPM | DIASTOLIC BLOOD PRESSURE: 79 MMHG | SYSTOLIC BLOOD PRESSURE: 112 MMHG

## 2024-02-14 DIAGNOSIS — N20.0 CALCULUS OF KIDNEY: ICD-10-CM

## 2024-02-14 DIAGNOSIS — N13.30 UNSPECIFIED HYDRONEPHROSIS: ICD-10-CM

## 2024-02-14 DIAGNOSIS — R35.0 FREQUENCY OF MICTURITION: ICD-10-CM

## 2024-02-14 DIAGNOSIS — N20.1 CALCULUS OF URETER: ICD-10-CM

## 2024-02-14 DIAGNOSIS — R30.0 DYSURIA: ICD-10-CM

## 2024-02-14 DIAGNOSIS — Z96.0 PRESENCE OF UROGENITAL IMPLANTS: ICD-10-CM

## 2024-02-14 PROCEDURE — 52310 CYSTOSCOPY AND TREATMENT: CPT

## 2024-02-14 PROCEDURE — 99213 OFFICE O/P EST LOW 20 MIN: CPT | Mod: 25

## 2024-02-14 RX ORDER — CIPROFLOXACIN HYDROCHLORIDE 500 MG/1
500 TABLET, FILM COATED ORAL
Qty: 2 | Refills: 0 | Status: ACTIVE | OUTPATIENT
Start: 2024-02-14

## 2024-02-15 PROBLEM — N20.0 CALCIUM OXALATE STONES: Status: ACTIVE | Noted: 2024-02-15

## 2024-02-15 NOTE — HISTORY OF PRESENT ILLNESS
[FreeTextEntry1] : MERA LIU is a 34 year old F who presents with right indwelling stent after presenting to [facility] on 2/2/24  with persistent flank pain and n/v w chills. UA in ED showed mod bacteria and pyuria, but cx was negative.  was on Cipro for coverage.   Went to OR on 2/4/24 for URS and removal of obstrucing ureteral 3 mm stone and Rt renal stone measuring same.  They revealed 90% CaOxMo and 10% CaP  She continues to have chills at home and is only telling me now about the low grade fevers last two days of 100.1 or 100

## 2024-02-15 NOTE — LETTER BODY
[Dear  ___] : Dear  [unfilled], [Consult Letter:] : I had the pleasure of evaluating your patient, [unfilled]. [Please see my note below.] : Please see my note below. [Consult Closing:] : Thank you very much for allowing me to participate in the care of this patient.  If you have any questions, please do not hesitate to contact me. [FreeTextEntry1] : Please see my note. I will keep you informed. [FreeTextEntry3] : Sincerely,  Estela Mckay MD Clinical  [facility]

## 2024-02-15 NOTE — ASSESSMENT
[FreeTextEntry1] : Urine cx obtained from scope.  stent removed easily and intact  JULIOCESAR and KUB to be done in 4-6 wks  If above normal, should then proceed to do a  24 hr urine collection to assess urinary milieu  Discussed basic tenets of stone avoidance.  Hydration no added Ca, vit C cut sodium intake  add freshly squeezed lemon, lime or orange juice to water  F/u 2 wks after 24 hr urine  21 min spent separate from cysto

## 2024-02-16 DIAGNOSIS — R30.0 DYSURIA: ICD-10-CM

## 2024-02-16 DIAGNOSIS — N20.0 CALCULUS OF KIDNEY: ICD-10-CM

## 2024-02-16 DIAGNOSIS — Z96.0 PRESENCE OF UROGENITAL IMPLANTS: ICD-10-CM

## 2024-02-18 LAB — BACTERIA UR CULT: NORMAL

## 2024-03-20 ENCOUNTER — APPOINTMENT (OUTPATIENT)
Dept: ULTRASOUND IMAGING | Facility: IMAGING CENTER | Age: 34
End: 2024-03-20

## 2024-03-20 ENCOUNTER — APPOINTMENT (OUTPATIENT)
Dept: RADIOLOGY | Facility: IMAGING CENTER | Age: 34
End: 2024-03-20

## 2024-04-03 ENCOUNTER — OUTPATIENT (OUTPATIENT)
Dept: OUTPATIENT SERVICES | Facility: HOSPITAL | Age: 34
LOS: 1 days | End: 2024-04-03
Payer: MEDICAID

## 2024-04-03 ENCOUNTER — APPOINTMENT (OUTPATIENT)
Dept: RADIOLOGY | Facility: IMAGING CENTER | Age: 34
End: 2024-04-03
Payer: MEDICAID

## 2024-04-03 ENCOUNTER — APPOINTMENT (OUTPATIENT)
Dept: ULTRASOUND IMAGING | Facility: IMAGING CENTER | Age: 34
End: 2024-04-03
Payer: MEDICAID

## 2024-04-03 DIAGNOSIS — N20.1 CALCULUS OF URETER: ICD-10-CM

## 2024-04-03 DIAGNOSIS — N20.0 CALCULUS OF KIDNEY: ICD-10-CM

## 2024-04-03 PROCEDURE — 76775 US EXAM ABDO BACK WALL LIM: CPT

## 2024-04-03 PROCEDURE — 76775 US EXAM ABDO BACK WALL LIM: CPT | Mod: 26

## 2024-04-03 PROCEDURE — 74018 RADEX ABDOMEN 1 VIEW: CPT | Mod: 26

## 2024-04-03 PROCEDURE — 74018 RADEX ABDOMEN 1 VIEW: CPT

## 2024-07-17 ENCOUNTER — APPOINTMENT (OUTPATIENT)
Dept: UROLOGY | Facility: CLINIC | Age: 34
End: 2024-07-17
Payer: MEDICAID

## 2024-07-17 VITALS
BODY MASS INDEX: 20.55 KG/M2 | HEIGHT: 63 IN | SYSTOLIC BLOOD PRESSURE: 104 MMHG | RESPIRATION RATE: 17 BRPM | HEART RATE: 84 BPM | DIASTOLIC BLOOD PRESSURE: 73 MMHG | WEIGHT: 116 LBS

## 2024-07-17 DIAGNOSIS — R82.993 HYPERURICOSURIA: ICD-10-CM

## 2024-07-17 DIAGNOSIS — N20.0 CALCULUS OF KIDNEY: ICD-10-CM

## 2024-07-17 PROCEDURE — 99214 OFFICE O/P EST MOD 30 MIN: CPT

## 2024-07-18 LAB
ALBUMIN SERPL ELPH-MCNC: 4.7 G/DL
ALP BLD-CCNC: 75 U/L
ALT SERPL-CCNC: 12 U/L
ANION GAP SERPL CALC-SCNC: 12 MMOL/L
AST SERPL-CCNC: 17 U/L
BILIRUB SERPL-MCNC: 0.9 MG/DL
BUN SERPL-MCNC: 6 MG/DL
CALCIUM SERPL-MCNC: 9.7 MG/DL
CALCIUM SERPL-MCNC: 9.7 MG/DL
CHLORIDE SERPL-SCNC: 106 MMOL/L
CO2 SERPL-SCNC: 22 MMOL/L
CREAT SERPL-MCNC: 0.49 MG/DL
EGFR: 127 ML/MIN/1.73M2
GLUCOSE SERPL-MCNC: 94 MG/DL
PARATHYROID HORMONE INTACT: 29 PG/ML
POTASSIUM SERPL-SCNC: 4.2 MMOL/L
PROT SERPL-MCNC: 7.4 G/DL
SODIUM SERPL-SCNC: 140 MMOL/L
URATE SERPL-MCNC: 3.2 MG/DL

## 2024-07-22 LAB — CA-I SERPL-SCNC: 5.3 MG/DL

## 2024-07-24 PROBLEM — R82.993 HYPERURICOSURIA: Status: ACTIVE | Noted: 2024-07-24

## 2024-07-28 LAB — 24R-OH-CALCIDIOL SERPL-MCNC: 80.8 PG/ML

## 2024-10-09 ENCOUNTER — APPOINTMENT (OUTPATIENT)
Dept: RADIOLOGY | Facility: IMAGING CENTER | Age: 34
End: 2024-10-09
Payer: MEDICAID

## 2024-10-09 ENCOUNTER — APPOINTMENT (OUTPATIENT)
Dept: ULTRASOUND IMAGING | Facility: IMAGING CENTER | Age: 34
End: 2024-10-09
Payer: MEDICAID

## 2024-10-09 ENCOUNTER — OUTPATIENT (OUTPATIENT)
Dept: OUTPATIENT SERVICES | Facility: HOSPITAL | Age: 34
LOS: 1 days | End: 2024-10-09
Payer: MEDICAID

## 2024-10-09 DIAGNOSIS — Z00.8 ENCOUNTER FOR OTHER GENERAL EXAMINATION: ICD-10-CM

## 2024-10-09 DIAGNOSIS — N20.0 CALCULUS OF KIDNEY: ICD-10-CM

## 2024-10-09 PROCEDURE — 76775 US EXAM ABDO BACK WALL LIM: CPT | Mod: 26

## 2024-10-09 PROCEDURE — 74018 RADEX ABDOMEN 1 VIEW: CPT

## 2024-10-09 PROCEDURE — 74018 RADEX ABDOMEN 1 VIEW: CPT | Mod: 26

## 2024-10-09 PROCEDURE — 76775 US EXAM ABDO BACK WALL LIM: CPT

## 2024-10-11 ENCOUNTER — TRANSCRIPTION ENCOUNTER (OUTPATIENT)
Age: 34
End: 2024-10-11

## 2024-10-16 ENCOUNTER — APPOINTMENT (OUTPATIENT)
Dept: UROLOGY | Facility: CLINIC | Age: 34
End: 2024-10-16

## 2025-02-24 NOTE — ED PROVIDER NOTE - DOMESTIC TRAVEL HIGH RISK QUESTION
No [FreeTextEntry1] : GENERAL APPEARANCE: Well developed, well nourished woman in no acute distress. CARDIOVASCULAR: Regular rate and rhythm   NEUROLOGIC EXAM: MENTAL STATUS: Alert and Oriented to person, place and time. Speech is fluent, without aphasia or dysarthria Behavior and affect appropriate to situation.                         CRANIAL NERVES: CN 2:    Visual fields are full to confrontation. CN 3, 4, 6: Extraocular movements are intact. No nystagmus or ophthalmoplegia is evident. Pupils are equally round and reactive to light. CN 5:     Facial sensation is intact to light touch in all 3 divisions CN 7:     Facial excursion is full and symmetric bilaterally. MOTOR: Strength is 5/5 throughout for age and stature. No orbiting or drift noted. SENSORY: Intact to light touch perception in all four extremities without extinction to double simultaneous stimulation COORD: Finger to nose testing without dysmetria bilaterally. GAIT: Normal station and gait.

## 2025-03-04 ENCOUNTER — APPOINTMENT (OUTPATIENT)
Dept: MRI IMAGING | Facility: IMAGING CENTER | Age: 35
End: 2025-03-04

## 2025-03-07 ENCOUNTER — OUTPATIENT (OUTPATIENT)
Dept: OUTPATIENT SERVICES | Facility: HOSPITAL | Age: 35
LOS: 1 days | End: 2025-03-07
Payer: MEDICAID

## 2025-03-07 ENCOUNTER — APPOINTMENT (OUTPATIENT)
Dept: MRI IMAGING | Facility: IMAGING CENTER | Age: 35
End: 2025-03-07

## 2025-03-07 DIAGNOSIS — Z00.8 ENCOUNTER FOR OTHER GENERAL EXAMINATION: ICD-10-CM

## 2025-03-07 DIAGNOSIS — N20.0 CALCULUS OF KIDNEY: ICD-10-CM

## 2025-03-07 DIAGNOSIS — K76.89 OTHER SPECIFIED DISEASES OF LIVER: ICD-10-CM

## 2025-03-07 PROCEDURE — A9585: CPT

## 2025-03-07 PROCEDURE — 74183 MRI ABD W/O CNTR FLWD CNTR: CPT | Mod: 26

## 2025-03-07 PROCEDURE — 74183 MRI ABD W/O CNTR FLWD CNTR: CPT

## 2025-04-04 ENCOUNTER — OUTPATIENT (OUTPATIENT)
Dept: OUTPATIENT SERVICES | Facility: HOSPITAL | Age: 35
LOS: 1 days | End: 2025-04-04
Payer: MEDICAID

## 2025-04-04 ENCOUNTER — APPOINTMENT (OUTPATIENT)
Dept: RADIOLOGY | Facility: IMAGING CENTER | Age: 35
End: 2025-04-04
Payer: MEDICAID

## 2025-04-04 ENCOUNTER — APPOINTMENT (OUTPATIENT)
Dept: ULTRASOUND IMAGING | Facility: IMAGING CENTER | Age: 35
End: 2025-04-04
Payer: MEDICAID

## 2025-04-04 DIAGNOSIS — N20.0 CALCULUS OF KIDNEY: ICD-10-CM

## 2025-04-04 PROCEDURE — 76775 US EXAM ABDO BACK WALL LIM: CPT | Mod: 26

## 2025-04-04 PROCEDURE — 76775 US EXAM ABDO BACK WALL LIM: CPT

## 2025-04-08 DIAGNOSIS — R10.9 UNSPECIFIED ABDOMINAL PAIN: ICD-10-CM

## 2025-04-10 ENCOUNTER — APPOINTMENT (OUTPATIENT)
Dept: UROLOGY | Facility: CLINIC | Age: 35
End: 2025-04-10

## 2025-04-11 ENCOUNTER — OUTPATIENT (OUTPATIENT)
Dept: OUTPATIENT SERVICES | Facility: HOSPITAL | Age: 35
LOS: 1 days | End: 2025-04-11
Payer: MEDICAID

## 2025-04-11 ENCOUNTER — APPOINTMENT (OUTPATIENT)
Dept: CT IMAGING | Facility: IMAGING CENTER | Age: 35
End: 2025-04-11

## 2025-04-11 DIAGNOSIS — R10.9 UNSPECIFIED ABDOMINAL PAIN: ICD-10-CM

## 2025-04-11 PROCEDURE — 74176 CT ABD & PELVIS W/O CONTRAST: CPT

## 2025-04-11 PROCEDURE — 74176 CT ABD & PELVIS W/O CONTRAST: CPT | Mod: 26

## 2025-07-23 NOTE — ED ADULT TRIAGE NOTE - NS ED NURSE BANDS TYPE
Name band; Detail Level: Detailed Depth Of Biopsy: dermis Was A Bandage Applied: Yes Size Of Lesion In Cm: 0 Biopsy Type: H and E Biopsy Method: Dermablade Anesthesia Type: 1% lidocaine with epinephrine Anesthesia Volume In Cc: 0.5 Hemostasis: Drysol Wound Care: Petrolatum Dressing: bandage Destruction After The Procedure: No Type Of Destruction Used: Curettage Curettage Text: The wound bed was treated with curettage after the biopsy was performed. Cryotherapy Text: The wound bed was treated with cryotherapy after the biopsy was performed. Electrodesiccation Text: The wound bed was treated with electrodesiccation after the biopsy was performed. Electrodesiccation And Curettage Text: The wound bed was treated with electrodesiccation and curettage after the biopsy was performed. Silver Nitrate Text: The wound bed was treated with silver nitrate after the biopsy was performed. Lab: 6 Lab Facility: 3 Medical Necessity Information: It is in your best interest to select a reason for this procedure from the list below. All of these items fulfill various CMS LCD requirements except the new and changing color options. Consent: Verbal consent was obtained and risks were reviewed including but not limited to scarring, infection, bleeding, scabbing, incomplete removal, nerve damage and allergy to anesthesia. Post-Care Instructions: I reviewed with the patient in detail post-care instructions. Patient is to wash the biopsy site with gentle soap and water, cover with vaseline and bandaid (changing daily) until well healed. Notification Instructions: Patient will be notified of biopsy results. However, patient instructed to call the office if not contacted within 2 weeks. Billing Type: Third-Party Bill Information: Selecting Yes will display possible errors in your note based on the variables you have selected. This validation is only offered as a suggestion for you. PLEASE NOTE THAT THE VALIDATION TEXT WILL BE REMOVED WHEN YOU FINALIZE YOUR NOTE. IF YOU WANT TO FAX A PRELIMINARY NOTE YOU WILL NEED TO TOGGLE THIS TO 'NO' IF YOU DO NOT WANT IT IN YOUR FAXED NOTE.

## (undated) DEVICE — FOLEY CATH 2-WAY 16FR 5CC LATEX LUBRICATH

## (undated) DEVICE — GLV 7 PROTEXIS (WHITE)

## (undated) DEVICE — WARMING BLANKET UPPER ADULT

## (undated) DEVICE — PACK CYSTO

## (undated) DEVICE — GLV 7.5 PROTEXIS (CREAM) NEU-THERA

## (undated) DEVICE — DRAINAGE BAG URINARY 2L

## (undated) DEVICE — SYR LUER LOK 10CC

## (undated) DEVICE — DRAPE EQUIPMENT BANDED BAG 30 X 30" (SHOWER CAP)

## (undated) DEVICE — IRR BULB PATHFINDER + 10"

## (undated) DEVICE — GLV 8 PROTEXIS (CREAM) NEU-THERA

## (undated) DEVICE — TUBING RANGER FLUID IRRIGATION SET DISP

## (undated) DEVICE — VENODYNE/SCD SLEEVE CALF MEDIUM

## (undated) DEVICE — SOL IRR BAG H2O 3000ML

## (undated) DEVICE — DRAPE 1/2 SHEET 40X57"

## (undated) DEVICE — FOLEY CATH 2-WAY 18FR 5CC LATEX HYDROGEL

## (undated) DEVICE — FOLEY HOLDER STATLOCK 2 WAY ADULT

## (undated) DEVICE — GLV 6.5 PROTEXIS (WHITE)

## (undated) DEVICE — DRAPE DRAINAGE BAG RELAX & GEMINI

## (undated) DEVICE — POSITIONER FOAM HEADREST (PINK)